# Patient Record
Sex: FEMALE | Race: WHITE | NOT HISPANIC OR LATINO | Employment: OTHER | URBAN - METROPOLITAN AREA
[De-identification: names, ages, dates, MRNs, and addresses within clinical notes are randomized per-mention and may not be internally consistent; named-entity substitution may affect disease eponyms.]

---

## 2017-02-27 ENCOUNTER — PREPPED CHART (OUTPATIENT)
Dept: URBAN - METROPOLITAN AREA CLINIC 36 | Facility: CLINIC | Age: 82
End: 2017-02-27

## 2017-10-23 ASSESSMENT — VISUAL ACUITY
OD_SC: 20/40
OS_SC: 20/50-2
OS_SC: J8
OD_SC: J3

## 2017-10-23 ASSESSMENT — TONOMETRY
OD_IOP_MMHG: 16
OS_IOP_MMHG: 16

## 2017-10-24 ENCOUNTER — FOLLOW UP (OUTPATIENT)
Dept: URBAN - METROPOLITAN AREA CLINIC 36 | Facility: CLINIC | Age: 82
End: 2017-10-24

## 2017-10-24 DIAGNOSIS — H11.10: ICD-10-CM

## 2017-10-24 DIAGNOSIS — H10.45: ICD-10-CM

## 2017-10-24 PROCEDURE — G8756 NO BP MEASURE DOC: HCPCS

## 2017-10-24 PROCEDURE — 92012 INTRM OPH EXAM EST PATIENT: CPT

## 2017-10-24 PROCEDURE — G8427 DOCREV CUR MEDS BY ELIG CLIN: HCPCS

## 2017-10-24 ASSESSMENT — VISUAL ACUITY
OS_SC: 20/70
OD_SC: 20/30

## 2017-10-24 ASSESSMENT — TONOMETRY
OS_IOP_MMHG: 16
OD_IOP_MMHG: 16

## 2018-05-02 ENCOUNTER — ESTABLISHED COMPREHENSIVE EXAM (OUTPATIENT)
Dept: URBAN - METROPOLITAN AREA CLINIC 36 | Facility: CLINIC | Age: 83
End: 2018-05-02

## 2018-05-02 DIAGNOSIS — H52.7: ICD-10-CM

## 2018-05-02 PROCEDURE — 92015 DETERMINE REFRACTIVE STATE: CPT

## 2018-05-02 PROCEDURE — 92014 COMPRE OPH EXAM EST PT 1/>: CPT

## 2018-05-02 ASSESSMENT — TONOMETRY
OS_IOP_MMHG: 12
OD_IOP_MMHG: 12

## 2018-05-02 ASSESSMENT — VISUAL ACUITY
OD_CC: 20/30+2
OS_SC: 20/50-2
OS_SC: J8
OS_CC: J2
OD_SC: J8
OS_CC: 20/30-2
OD_SC: 20/40
OD_CC: J1

## 2018-10-02 ENCOUNTER — ESTABLISHED PATIENT (OUTPATIENT)
Dept: URBAN - METROPOLITAN AREA CLINIC 36 | Facility: CLINIC | Age: 83
End: 2018-10-02

## 2018-10-02 DIAGNOSIS — H11.10: ICD-10-CM

## 2018-10-02 PROCEDURE — 92012 INTRM OPH EXAM EST PATIENT: CPT

## 2018-10-02 PROCEDURE — G8427 DOCREV CUR MEDS BY ELIG CLIN: HCPCS

## 2018-10-02 ASSESSMENT — VISUAL ACUITY
OD_PH: 20/40
OS_PH: 20/40
OS_SC: 20/80
OD_SC: 20/50

## 2018-10-02 ASSESSMENT — TONOMETRY
OD_IOP_MMHG: 16
OS_IOP_MMHG: 18

## 2019-05-06 ENCOUNTER — ESTABLISHED COMPREHENSIVE EXAM (OUTPATIENT)
Dept: URBAN - METROPOLITAN AREA CLINIC 36 | Facility: CLINIC | Age: 84
End: 2019-05-06

## 2019-05-06 DIAGNOSIS — H52.7: ICD-10-CM

## 2019-05-06 DIAGNOSIS — H26.491: ICD-10-CM

## 2019-05-06 DIAGNOSIS — H26.492: ICD-10-CM

## 2019-05-06 DIAGNOSIS — H35.3232: ICD-10-CM

## 2019-05-06 DIAGNOSIS — H11.10: ICD-10-CM

## 2019-05-06 DIAGNOSIS — H10.45: ICD-10-CM

## 2019-05-06 DIAGNOSIS — H35.073: ICD-10-CM

## 2019-05-06 PROCEDURE — 92014 COMPRE OPH EXAM EST PT 1/>: CPT

## 2019-05-06 PROCEDURE — 92134 CPTRZ OPH DX IMG PST SGM RTA: CPT

## 2019-05-06 PROCEDURE — 92015 DETERMINE REFRACTIVE STATE: CPT

## 2019-05-06 ASSESSMENT — VISUAL ACUITY
OS_SC: 20/70+2
OD_SC: 20/200
OS_SC: J8
OD_SC: J6
OD_CC: J2
OD_CC: 20/40
OS_CC: J3
OS_CC: 20/60-2

## 2019-05-06 ASSESSMENT — TONOMETRY
OS_IOP_MMHG: 16
OD_IOP_MMHG: 16

## 2019-11-04 ENCOUNTER — RETINA CONSULT (OUTPATIENT)
Dept: URBAN - METROPOLITAN AREA CLINIC 36 | Facility: CLINIC | Age: 84
End: 2019-11-04

## 2019-11-04 VITALS — DIASTOLIC BLOOD PRESSURE: 84 MMHG | HEART RATE: 72 BPM | HEIGHT: 55 IN | SYSTOLIC BLOOD PRESSURE: 158 MMHG

## 2019-11-04 DIAGNOSIS — H35.073: ICD-10-CM

## 2019-11-04 DIAGNOSIS — H35.3232: ICD-10-CM

## 2019-11-04 DIAGNOSIS — H35.363: ICD-10-CM

## 2019-11-04 DIAGNOSIS — H43.813: ICD-10-CM

## 2019-11-04 PROCEDURE — 9222550 BILAT EXTENDED OPHTHALMOSCOPY, FIRST

## 2019-11-04 PROCEDURE — 92004 COMPRE OPH EXAM NEW PT 1/>: CPT

## 2019-11-04 PROCEDURE — 92134 CPTRZ OPH DX IMG PST SGM RTA: CPT

## 2019-11-04 ASSESSMENT — TONOMETRY
OD_IOP_MMHG: 15
OS_IOP_MMHG: 15

## 2019-11-04 ASSESSMENT — VISUAL ACUITY
OD_CC: J3+1
OS_CC: J3+1
OS_CC: 20/50+1
OD_CC: 20/25
OS_PH: 20/40

## 2020-05-08 ENCOUNTER — ESTABLISHED COMPREHENSIVE EXAM (OUTPATIENT)
Dept: URBAN - METROPOLITAN AREA CLINIC 36 | Facility: CLINIC | Age: 85
End: 2020-05-08

## 2020-05-08 DIAGNOSIS — H26.492: ICD-10-CM

## 2020-05-08 DIAGNOSIS — H35.073: ICD-10-CM

## 2020-05-08 DIAGNOSIS — H52.7: ICD-10-CM

## 2020-05-08 DIAGNOSIS — H26.491: ICD-10-CM

## 2020-05-08 DIAGNOSIS — H35.3232: ICD-10-CM

## 2020-05-08 PROCEDURE — 92015 DETERMINE REFRACTIVE STATE: CPT

## 2020-05-08 PROCEDURE — 92134 CPTRZ OPH DX IMG PST SGM RTA: CPT

## 2020-05-08 PROCEDURE — 92014 COMPRE OPH EXAM EST PT 1/>: CPT

## 2020-05-08 RX ORDER — ERYTHROMYCIN 5 MG/G: OINTMENT OPHTHALMIC EVERY EVENING

## 2020-05-08 ASSESSMENT — VISUAL ACUITY
OD_CC: 20/40
OS_CC: 20/80-1
OD_SC: 20/40-1
OS_SC: J6
OD_CC: J2
OS_CC: J2
OS_SC: 20/400
OD_SC: J5

## 2021-05-14 ENCOUNTER — ESTABLISHED COMPREHENSIVE EXAM (OUTPATIENT)
Dept: URBAN - METROPOLITAN AREA CLINIC 36 | Facility: CLINIC | Age: 86
End: 2021-05-14

## 2021-05-14 DIAGNOSIS — H10.45: ICD-10-CM

## 2021-05-14 DIAGNOSIS — H26.492: ICD-10-CM

## 2021-05-14 DIAGNOSIS — H52.7: ICD-10-CM

## 2021-05-14 DIAGNOSIS — H35.073: ICD-10-CM

## 2021-05-14 DIAGNOSIS — H35.3232: ICD-10-CM

## 2021-05-14 DIAGNOSIS — H26.491: ICD-10-CM

## 2021-05-14 PROCEDURE — 92015 DETERMINE REFRACTIVE STATE: CPT

## 2021-05-14 PROCEDURE — 92014 COMPRE OPH EXAM EST PT 1/>: CPT

## 2021-05-14 PROCEDURE — 92134 CPTRZ OPH DX IMG PST SGM RTA: CPT

## 2021-05-14 ASSESSMENT — VISUAL ACUITY
OS_SC: 20/100
OS_CC: J3
OD_CC: J3
OD_CC: 20/40-1
OS_CC: 20/70
OD_SC: 20/70
OD_SC: J6
OS_SC: J3

## 2021-05-14 ASSESSMENT — TONOMETRY
OD_IOP_MMHG: 16
OS_IOP_MMHG: 14

## 2022-09-19 ENCOUNTER — HOSPITAL ENCOUNTER (OUTPATIENT)
Dept: ULTRASOUND IMAGING | Age: 87
Discharge: HOME | End: 2022-09-19
Payer: MEDICARE

## 2022-09-19 DIAGNOSIS — R10.84 COLICKY ABDOMINAL PAIN: ICD-10-CM

## 2022-09-19 PROCEDURE — 76705 ECHO EXAM OF ABDOMEN: CPT

## 2022-11-02 ENCOUNTER — HOSPITAL ENCOUNTER (OUTPATIENT)
Dept: CT IMAGING | Age: 87
Discharge: HOME | End: 2022-11-02
Payer: MEDICARE

## 2022-11-02 DIAGNOSIS — R10.9 ABDOMINAL PAIN, ACUTE: ICD-10-CM

## 2022-11-02 PROCEDURE — 6360000004 HC RX CONTRAST MEDICATION: Performed by: PSYCHIATRY & NEUROLOGY

## 2022-11-02 PROCEDURE — 74178 CT ABD&PLV WO CNTR FLWD CNTR: CPT

## 2022-11-02 RX ADMIN — DIATRIZOATE MEGLUMINE AND DIATRIZOATE SODIUM 20 ML: 600; 100 SOLUTION ORAL; RECTAL at 17:55

## 2022-11-02 RX ADMIN — IOPAMIDOL 75 ML: 755 INJECTION, SOLUTION INTRAVENOUS at 17:55

## 2023-02-08 DIAGNOSIS — R10.31 RIGHT LOWER QUADRANT PAIN: Primary | ICD-10-CM

## 2023-02-09 ENCOUNTER — HOSPITAL ENCOUNTER (OUTPATIENT)
Dept: CT IMAGING | Age: 88
Discharge: HOME | End: 2023-02-09
Payer: MEDICARE

## 2023-02-09 DIAGNOSIS — R10.31 RIGHT LOWER QUADRANT PAIN: ICD-10-CM

## 2023-02-09 PROCEDURE — 74176 CT ABD & PELVIS W/O CONTRAST: CPT

## 2023-02-16 ENCOUNTER — HOSPITAL ENCOUNTER (OUTPATIENT)
Dept: WOUND CARE | Age: 88
Discharge: HOME | End: 2023-02-16
Payer: MEDICARE

## 2023-02-16 VITALS
WEIGHT: 136.8 LBS | HEART RATE: 73 BPM | RESPIRATION RATE: 16 BRPM | SYSTOLIC BLOOD PRESSURE: 177 MMHG | DIASTOLIC BLOOD PRESSURE: 86 MMHG

## 2023-02-16 PROCEDURE — 99212 OFFICE O/P EST SF 10 MIN: CPT

## 2023-02-16 RX ORDER — MOMETASONE FUROATE 1 MG/ML
SOLUTION TOPICAL
COMMUNITY
Start: 2022-07-28 | End: 2023-02-16

## 2023-02-16 RX ORDER — TRIAMTERENE AND HYDROCHLOROTHIAZIDE 75; 50 MG/1; MG/1
TABLET ORAL
COMMUNITY
Start: 2022-12-31

## 2023-02-16 RX ORDER — AZELASTINE 1 MG/ML
SPRAY, METERED NASAL
COMMUNITY
Start: 2023-02-06

## 2023-02-16 RX ORDER — DICYCLOMINE HCL 20 MG
1 TABLET ORAL
COMMUNITY

## 2023-02-16 RX ORDER — DIPHENHYDRAMINE HCL 25 MG
TABLET ORAL
COMMUNITY
Start: 2022-10-14

## 2023-02-16 RX ORDER — MAGNESIUM OXIDE 400 MG/1
TABLET ORAL
COMMUNITY

## 2023-02-16 RX ORDER — AMITRIPTYLINE HYDROCHLORIDE 50 MG/1
TABLET, FILM COATED ORAL
COMMUNITY
Start: 2022-05-26

## 2023-02-16 RX ORDER — CHOLESTYRAMINE 4 G/9G
POWDER, FOR SUSPENSION ORAL
COMMUNITY

## 2023-02-16 RX ORDER — ATENOLOL 50 MG/1
TABLET ORAL
COMMUNITY
Start: 2022-05-31

## 2023-02-16 RX ORDER — ALPRAZOLAM 0.5 MG/1
TABLET ORAL
COMMUNITY
Start: 2023-02-13

## 2023-02-16 NOTE — PLAN OF CARE
Discharge instructions given. Patient verbalized understanding. Return to [facility] in 1 week(s).   Continue Ostomy Care

## 2023-02-16 NOTE — DISCHARGE INSTRUCTIONS
[facility]  [address]   [place], [place] [address]  Telephone: [phone]     FAX [phone]        Name: Jojo Alejandre  : 1935   AGE: 87 y.o.  MRN: [id number]  Today's Date: 2023    Ostomy skin care  Cleanse skin prior to applying a new pouch/wafer with:  yes - Water    yes - Cleanse skin with Mild Soap & Water  yes - May Shower    Other: Size- 1&3/8      Topical Treatments to skin around stoma:  Do not apply lotions, creams, or ointments to wound bed unless directed.   no - Apply barrier film/spray to skin around stoma and let it dry  no - Apply stoma powder to irritated skin around stoma, seal in with barrier film/spray and repeat x2  no - Apply antifungal cream to irritated skin surrounding stoma and work into skin until no longer able to feel cream.  no - Apply antifungal powder to irritated skin surrounding stoma, seal in with barrier film; and repeat x1  Other:     Pouch/Wafer Application     - Change your pouch every 3 days or when leaking.  yes - Appliance: 1 piece Coloplast    yes - Product Numbers:[id number], Ring [id number]  yes - Other: Accessories: rings, barrier, powder, and film  Other:____________________________________    Application of Pouch  yes - Gather supplies needed to change pouch and wafer.  yes - Assemble new pouch system before removing old one.  yes - Using the measuring guide or pattern, trace size of opening onto back new pouch system.  yes - Cut out opening.  yes - Remove the control backing  yes - Set aside assembled pouch  yes - Remove worn appliance by gently pulling away from skin.  yes - Look at back of the pouch before throwing away to see how it is worn.   yes - Wash skin with warm water or _____________, rinse and pat dry.    yes - Inspect  skin for redness or irritation.   yes - Apply  barrier seals or rings around stoma or back of wafer.  yes - Apply wafer/pouch system over stoma and press down firmly starting around stoma and working  outward. yes - Remove control backing paper tape border if applicable and press to skin. yes - Attach new pouch to wafer. yes - Secure bottom of pouch. yes - Apply barrier extenders to outside edges of pouch. yes - Lay/Sit quietly for 15-20 min to allow adhesives to mold to skin. Other: _____________________________________    Emptying Pouch; Colostomy or Urostomy  Colostomy:  yes - Empty  pouch when 1/3 full of gas, stool. Clean bottom edge with damp toilet paper or bathroom wipe and close pouch. no - For non-drainable pouch - remove when 1/2 full and throw away. Clean wafer flange (ring) with toilet tissue or damp paper towel before reapplying new pouch  Urostomy:  yes - Empty  pouch when 1/3 full of urine    N/A - Urostomy:  Attach bottom connector of urostomy pouch to a nighttime drainage system and switch spigot to the open position. N/A - Other: Urostomy: Care of nighttime drainage  ________________________________________________________________________    Other:  yes - Shower or Swim Care Pat pouch dry, Use hair dryer on cool setting to dry back of pouch and border, and Reapply barrier extenders or tape. N/A - Odor Control with deodorizer into bottom of pouch after each emptying  N/A - Lubricating gel to pouch to help emptying of thick stool  Other: See additional instructions _______________________________________     Contact Ostomy Care Nurse Practitioner  leaking issues and skin irritation    [email or web address]   -to order under garments     Follow up in the wound care center in 1 week(s)

## 2023-02-17 PROBLEM — Z43.3 COLOSTOMY CARE (HCC): Status: ACTIVE | Noted: 2023-02-17

## 2023-02-17 NOTE — PROGRESS NOTES
[address] [facility] [facility] [address], [address] [place] [address]  Telephone: [phone]      NAME:  Rojas RECORD NUMBER:  [id number]  AGE: 82 y.o. GENDER:  female  :  1935  TODAY'S DATE:  2023    Subjective       Chief Complaint   Patient presents with    Wound Check     Area around stoma with blisters and bleeding         HISTORY of PRESENT ILLNESS HPI     Bladimir Joiner is a 82 y.o. female New patient referred by self, who presents today for ostomy/stoma evaluation. Pt is also under care of Dr. Hannah Singer for cardiac issues and AAA. Pouching/Skin Issues: blisters and bleeding. Current Pouching System: Coloplast Sensura [place] convex drainable, pre-cut pouch. Precut to  \" opening. History of Ostomy: /Surgeon: in [place]  Wound/Ulcer Pain Timing/Severity: intermittent, mild  Quality of pain: tender  Severity:   10   Modifying Factors: None  Associated Signs/Symptoms: none    PAST MEDICAL HISTORY        Diagnosis Date    Arthritis     Colostomy care [facility])     Diverticulitis     Hypertension        PAST SURGICAL HISTORY    Past Surgical History:   Procedure Laterality Date    ABDOMEN SURGERY      APPENDECTOMY      HYSTERECTOMY (CERVIX STATUS UNKNOWN)         FAMILY HISTORY    History reviewed. No pertinent family history. SOCIAL HISTORY    Social History     Tobacco Use    Smoking status: Every Day     Packs/day: 0.50     Years: 7.00     Pack years: 3.50     Types: Cigarettes     The patient was instructed/counseled on smoking cessation. ALLERGIES    Allergies   Allergen Reactions    Amoxicillin-Pot Clavulanate     Diatrizoate Sodium And Diatrizoate Meglumine [Diatrizoate]      Patient allergic to ivp dye (itching)    Erythromycin     Moxifloxacin     Propoxyphene     Amoxicillin Itching    Doxycycline Itching     Other reaction(s):  Other (See Comments)  Blurred vision    Ipratropium Other (See Comments)    Ciprofibrate Nausea Only Fluticasone Other (See Comments)    Iodinated Contrast Media     Sulfa Antibiotics        MEDICATIONS    Current Outpatient Medications on File Prior to Encounter   Medication Sig Dispense Refill    amitriptyline (ELAVIL) 50 MG tablet amitriptyline 50 mg tablet   Take 1 tablet every day by oral route. atenolol (TENORMIN) 50 MG tablet atenolol 50 mg tablet   TAKE 1 TABLET TWICE DAILY      azelastine (ASTELIN) 0.1 % nasal spray SPRAY 2 SPRAYS INTO NOSE TWICE DAILY; USE IN EACH NOSTRIL AS DIRECTED      diphenhydrAMINE (WAL-DRYL ALLERGY) 25 MG tablet TAKE 2 TABLETS 1 HOUR BEFORE CT      ALPRAZolam (XANAX) 0.5 MG tablet       cholestyramine (QUESTRAN) 4 g packet cholestyramine (with sugar) 4 gram powder for susp in a packet   DISSOLVE 1 PACKET AND TK PO BID  FOR 30 DAYS UTD      dicyclomine (BENTYL) 20 MG tablet Take 1 tablet by mouth 4 times daily (after meals and at bedtime)      magnesium oxide (MAG-OX) 400 MG tablet magnesium oxide 400 mg (241.3 mg magnesium) tablet   TK 1 T PO  BID      triamterene-hydroCHLOROthiazide (MAXZIDE) 75-50 MG per tablet        No current facility-administered medications on file prior to encounter. REVIEW OF SYSTEMS    Pertinent items are noted in HPI. Objective    BP (!) 177/86   Pulse 73   Resp 16   Wt 136 lb 12.8 oz (62.1 kg)     Wt Readings from Last 3 Encounters:   02/16/23 136 lb 12.8 oz (62.1 kg)       PHYSICAL EXAM    General Appearance: alert and oriented to person, place and time and in no acute distress  Skin: warm and dry  Head: normocephalic and atraumatic  Eyes: pupils equal, round, and reactive to light  Pulmonary/Chest:  normal air movement, no respiratory distress  Cardiovascular: normal rate and regular rhythm    Ostomy Type: colostomy with formed stool    Stoma Assessment: red, moist measures 1 3/8\"    Peristomal Skin Assessment: some blister and skin breakdown noted, silver nitrate applied to areas to stop bleeding.  Small reducible peristomal hernia noted.       LABS       CBC:   Lab Results   Component Value Date/Time    WBC 7.4 2021 11:12 AM    HGB 14.3 2021 11:12 AM    HCT 41.3 2021 11:12 AM    MCV 96.4 2021 11:12 AM     2021 11:12 AM     BMP:   Lab Results   Component Value Date/Time     10/04/2022 03:02 PM    K 4.1 10/04/2022 03:02 PM    CL 92 10/04/2022 03:02 PM    CO2 29 10/04/2022 03:02 PM    BUN 24 10/04/2022 03:02 PM    CREATININE 1.1 10/04/2022 03:02 PM     PT/INR: No results found for: PROTIME, INR  Prealbumin: No results found for: PREALBUMIN  Albumin:  Lab Results   Component Value Date/Time    LABALBU 4.5 10/04/2022 03:02 PM     Sed Rate:No results found for: SEDRATE  Micro: No results found for: [facility]   Plan   Patient examined and evaluated and discussed how hernia has made stoma larger. Recommended use of a barrier ring and then when need to order new pouches to increase pre-cut opening to 1 3/8\" to accommodate size of stoma. She is agreeable and will follow up in one week. Time spent in visit: 25 minutes  Please see attached Discharge Instructions    Written patient dismissal instructions given to patient and signed by patient or POA. Discharge Instructions         [address], [address] [place] [address]  Telephone: [phone]        Name: Ivette Navarro  : 1935   AGE: 82 y.o. MRN: [id number]  Today's Date: 2023    Ostomy skin care  Cleanse skin prior to applying a new pouch/wafer with:  yes - Water    yes - Cleanse skin with Mild Soap & Water  yes - May Shower    Other: Size- 1&3/8      Topical Treatments to skin around stoma:  Do not apply lotions, creams, or ointments to wound bed unless directed.    no - Apply barrier film/spray to skin around stoma and let it dry  no - Apply stoma powder to irritated skin around stoma, seal in with barrier film/spray and repeat x2  no - Apply antifungal cream to irritated skin surrounding stoma and work into skin until no longer able to feel cream.  no - Apply antifungal powder to irritated skin surrounding stoma, seal in with barrier film; and repeat x1  Other:     Pouch/Wafer Application     - Change your pouch every 3 days or when leaking. yes - Appliance: 1 piece Coloplast    yes - Product Numbers:[id number], Ring [id number]  yes - Other: Accessories: rings, barrier, powder, and film  Other:____________________________________    Application of Pouch  yes - Gather supplies needed to change pouch and wafer. yes - Assemble new pouch system before removing old one.  yes - Using the measuring guide or pattern, trace size of opening onto back new pouch system. yes - Cut out opening. yes - Remove the control backing  yes - Set aside assembled pouch  yes - Remove worn appliance by gently pulling away from skin. yes - Look at back of the pouch before throwing away to see how it is worn.   yes - Wash skin with warm water or _____________, rinse and pat dry. yes - Inspect  skin for redness or irritation. yes - Apply  barrier seals or rings around stoma or back of wafer. yes - Apply wafer/pouch system over stoma and press down firmly starting around stoma and working outward. yes - Remove control backing paper tape border if applicable and press to skin. yes - Attach new pouch to wafer. yes - Secure bottom of pouch. yes - Apply barrier extenders to outside edges of pouch. yes - Lay/Sit quietly for 15-20 min to allow adhesives to mold to skin. Other: _____________________________________    Emptying Pouch; Colostomy or Urostomy  Colostomy:  yes - Empty  pouch when 1/3 full of gas, stool. Clean bottom edge with damp toilet paper or bathroom wipe and close pouch. no - For non-drainable pouch - remove when 1/2 full and throw away.   Clean wafer flange (ring) with toilet tissue or damp paper towel before reapplying new pouch  Urostomy:  yes - Empty  pouch when 1/3 full of urine    N/A - Urostomy:  Attach bottom connector of urostomy pouch to a nighttime drainage system and switch spigot to the open position. N/A - Other: Urostomy: Care of nighttime drainage  ________________________________________________________________________    Other:  yes - Shower or Swim Care Pat pouch dry, Use hair dryer on cool setting to dry back of pouch and border, and Reapply barrier extenders or tape. N/A - Odor Control with deodorizer into bottom of pouch after each emptying  N/A - Lubricating gel to pouch to help emptying of thick stool  Other: See additional instructions _______________________________________     Contact Ostomy Care Nurse Practitioner  leaking issues and skin irritation    [email or web address]   -to order under garments     Follow up in the wound care center in 1 week(s)            Electronically signed by AYAN Bell CNP on 2/17/2023 at 1:21 PM

## 2023-02-20 NOTE — DISCHARGE INSTRUCTIONS
[facility]  [address]  Telephone: [phone]           Name: Sammie Ortiz  : 1935   AGE: 82 y.o. MRN: [id number]  Today's Date: 2023     Ostomy skin care  Cleanse skin prior to applying a new pouch/wafer with:  yes - Water         yes - Cleanse skin with Mild Soap & Water  yes - May Shower    Other: Size- 1&3/8       Topical Treatments to skin around stoma:  Do not apply lotions, creams, or ointments to wound bed unless directed. no - Apply barrier film/spray to skin around stoma and let it dry  no - Apply stoma powder to irritated skin around stoma, seal in with barrier film/spray and repeat x2  no - Apply antifungal cream to irritated skin surrounding stoma and work into skin until no longer able to feel cream.  no - Apply antifungal powder to irritated skin surrounding stoma, seal in with barrier film; and repeat x1  Other:      Pouch/Wafer Application     - Change your pouch every 3 days or when leaking. yes - Appliance: 1 piece Coloplast    yes - Product Numbers:[id number], Ring [id number]  yes - Other: Accessories: rings, barrier, powder, and film  Other:____________________________________     Application of Pouch  yes - Gather supplies needed to change pouch and wafer. yes - Assemble new pouch system before removing old one.  yes - Using the measuring guide or pattern, trace size of opening onto back new pouch system. yes - Cut out opening. yes - Remove the control backing  yes - Set aside assembled pouch  yes - Remove worn appliance by gently pulling away from skin. yes - Look at back of the pouch before throwing away to see how it is worn.   yes - Wash skin with warm water or _____________, rinse and pat dry. yes - Inspect  skin for redness or irritation. yes - Apply  barrier seals or rings around stoma or back of wafer.   yes - Apply wafer/pouch system over stoma and press down firmly starting around stoma and working outward. yes - Remove control backing paper tape border if applicable and press to skin. yes - Attach new pouch to wafer. yes - Secure bottom of pouch. yes - Apply barrier extenders to outside edges of pouch. yes - Lay/Sit quietly for 15-20 min to allow adhesives to mold to skin. Other: _____________________________________     Emptying Pouch; Colostomy or Urostomy  Colostomy:  yes - Empty  pouch when 1/3 full of gas, stool. Clean bottom edge with damp toilet paper or bathroom wipe and close pouch. no - For non-drainable pouch - remove when 1/2 full and throw away. Clean wafer flange (ring) with toilet tissue or damp paper towel before reapplying new pouch  Urostomy:  yes - Empty  pouch when 1/3 full of urine     N/A - Urostomy:  Attach bottom connector of urostomy pouch to a nighttime drainage system and switch spigot to the open position. N/A - Other: Urostomy: Care of nighttime drainage  ________________________________________________________________________     Other:  yes - Shower or Swim Care Pat pouch dry, Use hair dryer on cool setting to dry back of pouch and border, and Reapply barrier extenders or tape. N/A - Odor Control with deodorizer into bottom of pouch after each emptying  N/A - Lubricating gel to pouch to help emptying of thick stool  Other: See additional instructions _______________________________________      Contact Ostomy Care Nurse Practitioner  leaking issues and skin irritation     [email or web address]   -to order under garments     No Follow up in the wound care center

## 2023-02-23 ENCOUNTER — HOSPITAL ENCOUNTER (OUTPATIENT)
Dept: WOUND CARE | Age: 88
Discharge: HOME | End: 2023-02-23
Payer: MEDICARE

## 2023-02-23 VITALS
DIASTOLIC BLOOD PRESSURE: 80 MMHG | HEART RATE: 72 BPM | SYSTOLIC BLOOD PRESSURE: 154 MMHG | RESPIRATION RATE: 16 BRPM | WEIGHT: 137.2 LBS

## 2023-02-23 PROCEDURE — 99212 OFFICE O/P EST SF 10 MIN: CPT

## 2023-02-23 NOTE — PLAN OF CARE
Discharge instructions given. Patient verbalized understanding. Return to [facility] in 1 week(s).   Continue Ostomy Care

## 2023-02-24 NOTE — PROGRESS NOTES
[address] [facility]   [address], [address] [place] [address]  Telephone: [phone]      NAME:  Rojas RECORD NUMBER:  [id number]  AGE: 82 y.o. GENDER:  female  :  1935  TODAY'S DATE:  2023    Subjective       Chief Complaint   Patient presents with    Wound Check     Follow up ostomy care         HISTORY of PRESENT ILLNESS TOR Marks is a 82 y.o. female patient referred by self, who presents today for ostomy/stoma evaluation. Pt is also under care of Dr. Aj Kent for cardiac issues and AAA. Pt presents today without blisters or more bleeding. She did like the barrier ring but is still getting 3-4 day wear time without barrier ring. She will follow-up as needed. Pouching/Skin Issues: blisters and bleeding. Current Pouching System: Coloplast Sensura [place] convex drainable, pre-cut pouch. Precut to  \" opening. History of Ostomy: 2014/Surgeon: in [place]  Wound/Ulcer Pain Timing/Severity: intermittent, mild  Quality of pain: tender  Severity:  1 / 10   Modifying Factors: None  Associated Signs/Symptoms: none  PAST MEDICAL HISTORY        Diagnosis Date    Arthritis     Colostomy care [facility])     Diverticulitis     Hypertension        PAST SURGICAL HISTORY    Past Surgical History:   Procedure Laterality Date    ABDOMEN SURGERY      APPENDECTOMY      HYSTERECTOMY (CERVIX STATUS UNKNOWN)         FAMILY HISTORY    No family history on file. SOCIAL HISTORY    Social History     Tobacco Use    Smoking status: Every Day     Packs/day: 0.50     Years: 7.00     Pack years: 3.50     Types: Cigarettes     The patient was instructed/counseled on smoking cessation.    ALLERGIES    Allergies   Allergen Reactions    Amoxicillin-Pot Clavulanate     Diatrizoate Sodium And Diatrizoate Meglumine [Diatrizoate]      Patient allergic to ivp dye (itching)    Erythromycin     Moxifloxacin     Propoxyphene     Amoxicillin Itching    Doxycycline Itching Other reaction(s): Other (See Comments)  Blurred vision    Ipratropium Other (See Comments)    Ciprofibrate Nausea Only    Fluticasone Other (See Comments)    Iodinated Contrast Media     Sulfa Antibiotics        MEDICATIONS    Current Outpatient Medications on File Prior to Encounter   Medication Sig Dispense Refill    ALPRAZolam (XANAX) 0.5 MG tablet       amitriptyline (ELAVIL) 50 MG tablet amitriptyline 50 mg tablet   Take 1 tablet every day by oral route. atenolol (TENORMIN) 50 MG tablet atenolol 50 mg tablet   TAKE 1 TABLET TWICE DAILY      azelastine (ASTELIN) 0.1 % nasal spray SPRAY 2 SPRAYS INTO NOSE TWICE DAILY; USE IN EACH NOSTRIL AS DIRECTED      cholestyramine (QUESTRAN) 4 g packet cholestyramine (with sugar) 4 gram powder for susp in a packet   DISSOLVE 1 PACKET AND TK PO BID  FOR 30 DAYS UTD      dicyclomine (BENTYL) 20 MG tablet Take 1 tablet by mouth 4 times daily (after meals and at bedtime)      diphenhydrAMINE (WAL-DRYL ALLERGY) 25 MG tablet TAKE 2 TABLETS 1 HOUR BEFORE CT      magnesium oxide (MAG-OX) 400 MG tablet magnesium oxide 400 mg (241.3 mg magnesium) tablet   TK 1 T PO  BID      triamterene-hydroCHLOROthiazide (MAXZIDE) 75-50 MG per tablet        No current facility-administered medications on file prior to encounter. REVIEW OF SYSTEMS    Pertinent items are noted in HPI. Objective    BP (!) 154/80   Pulse 72   Resp 16   Wt 137 lb 3.2 oz (62.2 kg)     Wt Readings from Last 3 Encounters:   02/23/23 137 lb 3.2 oz (62.2 kg)   02/16/23 136 lb 12.8 oz (62.1 kg)       PHYSICAL EXAM    General Appearance: alert and oriented to person, place and time  Skin: warm and dry  Head: normocephalic and atraumatic  Eyes: pupils equal, round, and reactive to light  Pulmonary/Chest normal air movement, no respiratory distress  Cardiovascular: normal rate and regular rhythm    Ostomy Type: colostomy with flat, red, moist stoma.         Plan   Patient examined and evaluated and pt agreeable to follow-up as needed. Time spent in visit 15 minutes  Please see attached Discharge Instructions    Written patient dismissal instructions given to patient and signed by patient or POA. Discharge Instructions         [address], [address] [place] [address]  Telephone: [phone]           Name: Indra Grimaldo  : 1935   AGE: 82 y.o. MRN: [id number]  Today's Date: 2023     Ostomy skin care  Cleanse skin prior to applying a new pouch/wafer with:  yes - Water         yes - Cleanse skin with Mild Soap & Water  yes - May Shower    Other: Size- 1&3/8       Topical Treatments to skin around stoma:  Do not apply lotions, creams, or ointments to wound bed unless directed. no - Apply barrier film/spray to skin around stoma and let it dry  no - Apply stoma powder to irritated skin around stoma, seal in with barrier film/spray and repeat x2  no - Apply antifungal cream to irritated skin surrounding stoma and work into skin until no longer able to feel cream.  no - Apply antifungal powder to irritated skin surrounding stoma, seal in with barrier film; and repeat x1  Other:      Pouch/Wafer Application     - Change your pouch every 3 days or when leaking. yes - Appliance: 1 piece Coloplast    yes - Product Numbers:[id number], Ring [id number]  yes - Other: Accessories: rings, barrier, powder, and film  Other:____________________________________     Application of Pouch  yes - Gather supplies needed to change pouch and wafer. yes - Assemble new pouch system before removing old one.  yes - Using the measuring guide or pattern, trace size of opening onto back new pouch system. yes - Cut out opening. yes - Remove the control backing  yes - Set aside assembled pouch  yes - Remove worn appliance by gently pulling away from skin.   yes - Look at back of the pouch before throwing away to see how it is worn.   yes - Wash skin with warm water or _____________, rinse and pat dry. yes - Inspect  skin for redness or irritation. yes - Apply  barrier seals or rings around stoma or back of wafer. yes - Apply wafer/pouch system over stoma and press down firmly starting around stoma and working outward. yes - Remove control backing paper tape border if applicable and press to skin. yes - Attach new pouch to wafer. yes - Secure bottom of pouch. yes - Apply barrier extenders to outside edges of pouch. yes - Lay/Sit quietly for 15-20 min to allow adhesives to mold to skin. Other: _____________________________________     Emptying Pouch; Colostomy or Urostomy  Colostomy:  yes - Empty  pouch when 1/3 full of gas, stool. Clean bottom edge with damp toilet paper or bathroom wipe and close pouch. no - For non-drainable pouch - remove when 1/2 full and throw away. Clean wafer flange (ring) with toilet tissue or damp paper towel before reapplying new pouch  Urostomy:  yes - Empty  pouch when 1/3 full of urine     N/A - Urostomy:  Attach bottom connector of urostomy pouch to a nighttime drainage system and switch spigot to the open position. N/A - Other: Urostomy: Care of nighttime drainage  ________________________________________________________________________     Other:  yes - Shower or Swim Care Pat pouch dry, Use hair dryer on cool setting to dry back of pouch and border, and Reapply barrier extenders or tape. N/A - Odor Control with deodorizer into bottom of pouch after each emptying  N/A - Lubricating gel to pouch to help emptying of thick stool  Other: See additional instructions _______________________________________      Contact Ostomy Care Nurse Practitioner  leaking issues and skin irritation     [email or web address]   -to order under garments     No Follow up in the wound care center           Electronically signed by AYAN Yee CNP on 2/24/2023 at 1:30 PM

## 2023-08-23 ENCOUNTER — HOSPITAL ENCOUNTER (OUTPATIENT)
Dept: CT IMAGING | Age: 88
Discharge: HOME | End: 2023-08-23
Attending: INTERNAL MEDICINE
Payer: MEDICARE

## 2023-08-23 DIAGNOSIS — I77.1 STRICTURE OF ARTERY (HCC): ICD-10-CM

## 2023-08-23 LAB
BUN SERPL-MCNC: 28 MG/DL (ref 7–20)
CREAT SERPL-MCNC: 1 MG/DL (ref 0.6–1.2)
GFR SERPLBLD CREATININE-BSD FMLA CKD-EPI: 55 ML/MIN/{1.73_M2}

## 2023-08-23 PROCEDURE — 84520 ASSAY OF UREA NITROGEN: CPT

## 2023-08-23 PROCEDURE — 36415 COLL VENOUS BLD VENIPUNCTURE: CPT

## 2023-08-23 PROCEDURE — 82565 ASSAY OF CREATININE: CPT

## 2023-08-23 PROCEDURE — 74177 CT ABD & PELVIS W/CONTRAST: CPT

## 2023-08-23 PROCEDURE — 6360000004 HC RX CONTRAST MEDICATION: Performed by: INTERNAL MEDICINE

## 2023-08-23 RX ADMIN — IOPAMIDOL 75 ML: 755 INJECTION, SOLUTION INTRAVENOUS at 12:07

## 2023-10-19 ENCOUNTER — HOSPITAL ENCOUNTER (OUTPATIENT)
Dept: ULTRASOUND IMAGING | Age: 88
Discharge: HOME | End: 2023-10-19
Attending: INTERNAL MEDICINE
Payer: MEDICARE

## 2023-10-19 DIAGNOSIS — R11.0 NAUSEA: ICD-10-CM

## 2023-10-19 PROCEDURE — 76700 US EXAM ABDOM COMPLETE: CPT

## 2023-10-31 ENCOUNTER — OFFICE VISIT (OUTPATIENT)
Dept: VASCULAR SURGERY | Age: 88
End: 2023-10-31
Payer: MEDICARE

## 2023-10-31 VITALS
BODY MASS INDEX: 24.1 KG/M2 | DIASTOLIC BLOOD PRESSURE: 86 MMHG | SYSTOLIC BLOOD PRESSURE: 124 MMHG | HEIGHT: 63 IN | WEIGHT: 136 LBS

## 2023-10-31 DIAGNOSIS — I71.43 INFRARENAL ABDOMINAL AORTIC ANEURYSM (AAA) WITHOUT RUPTURE (HCC): Primary | ICD-10-CM

## 2023-10-31 PROBLEM — N60.19 FIBROCYSTIC DISEASE OF BREAST: Status: ACTIVE | Noted: 2017-01-27

## 2023-10-31 PROBLEM — H35.3110 NONEXUDATIVE AGE-RELATED MACULAR DEGENERATION OF RIGHT EYE: Status: ACTIVE | Noted: 2022-01-25

## 2023-10-31 PROBLEM — F41.1 GENERALIZED ANXIETY DISORDER: Status: ACTIVE | Noted: 2017-01-27

## 2023-10-31 PROBLEM — F17.200 TOBACCO DEPENDENCE SYNDROME: Status: ACTIVE | Noted: 2023-10-31

## 2023-10-31 PROBLEM — E61.2 MAGNESIUM DEFICIENCY: Status: ACTIVE | Noted: 2017-01-27

## 2023-10-31 PROBLEM — M21.70 LEG LENGTH INEQUALITY: Status: ACTIVE | Noted: 2020-12-01

## 2023-10-31 PROBLEM — K52.9 GASTROENTERITIS: Status: ACTIVE | Noted: 2023-10-31

## 2023-10-31 PROBLEM — L57.0 ACTINIC KERATOSIS: Status: ACTIVE | Noted: 2017-03-16

## 2023-10-31 PROBLEM — I10 BENIGN ESSENTIAL HYPERTENSION: Status: ACTIVE | Noted: 2023-10-31

## 2023-10-31 PROBLEM — E66.3 OVERWEIGHT WITH BODY MASS INDEX (BMI) 25.0-29.9: Status: ACTIVE | Noted: 2023-10-31

## 2023-10-31 PROBLEM — H65.23 BILATERAL CHRONIC SEROUS OTITIS MEDIA: Status: ACTIVE | Noted: 2017-10-09

## 2023-10-31 PROBLEM — H65.91 RIGHT SEROUS OTITIS MEDIA: Status: ACTIVE | Noted: 2018-12-28

## 2023-10-31 PROBLEM — R19.00 SWOLLEN ABDOMEN: Status: ACTIVE | Noted: 2017-08-11

## 2023-10-31 PROBLEM — Z90.49 HISTORY OF COLECTOMY: Status: ACTIVE | Noted: 2018-07-18

## 2023-10-31 PROBLEM — M54.9 CHRONIC BACK PAIN: Status: ACTIVE | Noted: 2018-09-13

## 2023-10-31 PROBLEM — T78.40XA ACUTE ALLERGIC REACTION: Status: ACTIVE | Noted: 2023-10-31

## 2023-10-31 PROBLEM — R21 SKIN ERUPTION: Status: ACTIVE | Noted: 2023-10-31

## 2023-10-31 PROBLEM — R10.9 ABDOMINAL PAIN: Status: ACTIVE | Noted: 2023-10-31

## 2023-10-31 PROBLEM — L98.9 SKIN LESION: Status: ACTIVE | Noted: 2017-01-27

## 2023-10-31 PROBLEM — M25.519 SHOULDER PAIN: Status: ACTIVE | Noted: 2019-10-10

## 2023-10-31 PROBLEM — M51.36 DDD (DEGENERATIVE DISC DISEASE), LUMBAR: Status: ACTIVE | Noted: 2021-10-19

## 2023-10-31 PROBLEM — M51.369 DDD (DEGENERATIVE DISC DISEASE), LUMBAR: Status: ACTIVE | Noted: 2021-10-19

## 2023-10-31 PROBLEM — M54.32 BILATERAL SCIATICA: Status: ACTIVE | Noted: 2020-12-01

## 2023-10-31 PROBLEM — K58.9 IRRITABLE BOWEL SYNDROME: Status: ACTIVE | Noted: 2023-10-31

## 2023-10-31 PROBLEM — E87.8 ELECTROLYTE IMBALANCE: Status: ACTIVE | Noted: 2017-01-27

## 2023-10-31 PROBLEM — F41.1 ANXIETY STATE: Status: ACTIVE | Noted: 2023-10-31

## 2023-10-31 PROBLEM — K52.9 NONINFECTIOUS GASTROENTERITIS: Status: ACTIVE | Noted: 2023-10-31

## 2023-10-31 PROBLEM — M75.50 BURSITIS OF SHOULDER: Status: ACTIVE | Noted: 2023-10-31

## 2023-10-31 PROBLEM — M79.604 PAIN OF RIGHT LOWER EXTREMITY: Status: ACTIVE | Noted: 2020-12-01

## 2023-10-31 PROBLEM — R42 DIZZINESS: Status: ACTIVE | Noted: 2017-10-09

## 2023-10-31 PROBLEM — I71.40 ABDOMINAL AORTIC ANEURYSM (AAA) WITHOUT RUPTURE (HCC): Status: ACTIVE | Noted: 2021-06-16

## 2023-10-31 PROBLEM — E87.1 HYPONATREMIA: Status: ACTIVE | Noted: 2019-04-11

## 2023-10-31 PROBLEM — J42 CHRONIC BRONCHITIS (HCC): Status: ACTIVE | Noted: 2017-01-27

## 2023-10-31 PROBLEM — H92.03 OTALGIA OF BOTH EARS: Status: ACTIVE | Noted: 2017-10-09

## 2023-10-31 PROBLEM — N39.0 URINARY TRACT INFECTIOUS DISEASE: Status: ACTIVE | Noted: 2020-08-23

## 2023-10-31 PROBLEM — H10.30 ACUTE CONJUNCTIVITIS: Status: ACTIVE | Noted: 2019-12-30

## 2023-10-31 PROBLEM — H66.90 OTITIS MEDIA: Status: ACTIVE | Noted: 2019-07-11

## 2023-10-31 PROBLEM — G89.29 CHRONIC BACK PAIN: Status: ACTIVE | Noted: 2018-09-13

## 2023-10-31 PROBLEM — E87.6 HYPOKALEMIA: Status: ACTIVE | Noted: 2023-10-31

## 2023-10-31 PROBLEM — M54.31 BILATERAL SCIATICA: Status: ACTIVE | Noted: 2020-12-01

## 2023-10-31 PROBLEM — M26.609 TEMPOROMANDIBULAR JOINT DISORDER: Status: ACTIVE | Noted: 2020-05-21

## 2023-10-31 PROBLEM — J30.9 ALLERGIC RHINITIS: Status: ACTIVE | Noted: 2023-10-31

## 2023-10-31 PROBLEM — M41.25 OTHER IDIOPATHIC SCOLIOSIS, THORACOLUMBAR REGION: Status: ACTIVE | Noted: 2021-10-19

## 2023-10-31 PROCEDURE — 4004F PT TOBACCO SCREEN RCVD TLK: CPT | Performed by: SURGERY

## 2023-10-31 PROCEDURE — G8484 FLU IMMUNIZE NO ADMIN: HCPCS | Performed by: SURGERY

## 2023-10-31 PROCEDURE — 99203 OFFICE O/P NEW LOW 30 MIN: CPT | Performed by: SURGERY

## 2023-10-31 PROCEDURE — 1123F ACP DISCUSS/DSCN MKR DOCD: CPT | Performed by: SURGERY

## 2023-10-31 PROCEDURE — G8428 CUR MEDS NOT DOCUMENT: HCPCS | Performed by: SURGERY

## 2023-10-31 PROCEDURE — G8420 CALC BMI NORM PARAMETERS: HCPCS | Performed by: SURGERY

## 2023-10-31 PROCEDURE — 1090F PRES/ABSN URINE INCON ASSESS: CPT | Performed by: SURGERY

## 2023-10-31 RX ORDER — NEOMYCIN SULFATE 500 MG/1
TABLET ORAL
COMMUNITY

## 2023-10-31 RX ORDER — DIFLUPREDNATE OPHTHALMIC 0.5 MG/ML
EMULSION OPHTHALMIC
COMMUNITY

## 2023-10-31 RX ORDER — TRIAMCINOLONE ACETONIDE 1 MG/G
CREAM TOPICAL 2 TIMES DAILY
COMMUNITY
Start: 2023-09-07

## 2023-10-31 RX ORDER — METHYLPREDNISOLONE ACETATE 80 MG/ML
INJECTION, SUSPENSION INTRA-ARTICULAR; INTRALESIONAL; INTRAMUSCULAR; SOFT TISSUE
COMMUNITY
Start: 2015-03-11

## 2023-10-31 RX ORDER — CLOTRIMAZOLE AND BETAMETHASONE DIPROPIONATE 10; .64 MG/G; MG/G
CREAM TOPICAL
COMMUNITY

## 2023-10-31 RX ORDER — FLUOCINOLONE ACETONIDE 0.11 MG/ML
OIL TOPICAL
COMMUNITY

## 2023-10-31 RX ORDER — METRONIDAZOLE 500 MG/1
TABLET ORAL
COMMUNITY

## 2023-10-31 RX ORDER — PROCHLORPERAZINE MALEATE 10 MG
TABLET ORAL
COMMUNITY

## 2023-10-31 RX ORDER — CLARITHROMYCIN 500 MG/1
500 TABLET, COATED ORAL 2 TIMES DAILY
COMMUNITY
Start: 2023-09-07

## 2023-10-31 RX ORDER — POTASSIUM CHLORIDE 750 MG/1
1 TABLET, FILM COATED, EXTENDED RELEASE ORAL DAILY
COMMUNITY
Start: 2015-04-07

## 2023-10-31 RX ORDER — ASPIRIN 81 MG/1
81 TABLET ORAL DAILY
COMMUNITY

## 2023-10-31 RX ORDER — TRAMADOL HYDROCHLORIDE 50 MG/1
TABLET ORAL
COMMUNITY

## 2023-10-31 NOTE — PROGRESS NOTES
[facility]  Consultation Note    Chief Complaint / Reason for Consultation  AAA    History of Present Illness  Patient is a 82 y.o. female referred today for an infrarenal aortic aneurysm. 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 She was evaluated by Dr. Dominique Mojica at [facility] and was told she was not a endograft candidate and that given her age she would be high risk for open surgical repair. They did not recommend follow-up per her report. She underwent ultrasound recently showing possible growth to 6 cm. She does have a left lower quadrant ostomy and has had some weakening in the area with bulging. Review of Systems     Denies fevers, chills, chest pain, shortness of breath, nausea, vomiting, hematemesis, diarrhea, constipation, melena, hematochezia, wt changes, vision problems, blindness, hearing problems, facial droop, slurred speech, extremity weakness, extremity numbness, dysuria. Past Medical History:   has a past medical history of Arthritis, Colostomy care ([facility]), Diverticulitis, and Hypertension. Past Surgical History:   has a past surgical history that includes Abdomen surgery; Appendectomy; and Hysterectomy.      Medications:  Current Outpatient Medications on File Prior to Visit   Medication Sig Dispense Refill    clarithromycin (BIAXIN) 500 MG tablet Take 1 tablet by mouth 2 times daily      methylPREDNISolone acetate (DEPO-MEDROL) 80 MG/ML injection       potassium bicarbonate (EFFER-K/K-LYTE) 25 MEQ disintegrating tablet DISSOLVE 1 TABLET IN 3-4 OUNCES OF COLD WATER AND TAKE TWICE DAILY      potassium chloride (KLOR-CON) 10 MEQ extended release tablet Take 1 tablet by mouth daily      triamcinolone (KENALOG) 0.1 % cream Apply topically 2 times daily      aspirin 81 MG EC tablet Take 1 tablet by mouth daily      clotrimazole-betamethasone (LOTRISONE) 1-0.05 % cream apply BID      diclofenac

## 2023-10-31 NOTE — TELEPHONE ENCOUNTER
Called in Prednisone 50 mg qty 3  Take 1 (13)  hours prior to CT scan  Take 1 (7) hours prior to CT scan  Take 1 (1) hour prior to CT scan along with 50 mg Benadryl. Instructions given to patient.

## 2024-01-10 NOTE — PATIENT INSTRUCTIONS
[facility]  [address]   [place], [place] [address]  Telephone: [phone]     FAX [phone]           Name: Jojo Alejandre  : 1935   AGE: 87 y.o.  MRN: [id number]  Today's Date: 2023     Ostomy skin care  Cleanse skin prior to applying a new pouch/wafer with:  yes - Water         yes - Cleanse skin with Mild Soap & Water  yes - May Shower    Other: Size- 1&3/8       Topical Treatments to skin around stoma:  Do not apply lotions, creams, or ointments to wound bed unless directed.   no - Apply barrier film/spray to skin around stoma and let it dry  no - Apply stoma powder to irritated skin around stoma, seal in with barrier film/spray and repeat x2  no - Apply antifungal cream to irritated skin surrounding stoma and work into skin until no longer able to feel cream.  no - Apply antifungal powder to irritated skin surrounding stoma, seal in with barrier film; and repeat x1  Other:      Pouch/Wafer Application     - Change your pouch every 3 days or when leaking.  yes - Appliance: 1 piece [employer]    yes - Product Numbers:[id number], Ring [id number]  yes - Other: Accessories: rings, barrier, powder, and film  Other:____________________________________     Application of Pouch  yes - Gather supplies needed to change pouch and wafer.  yes - Assemble new pouch system before removing old one.  yes - Using the measuring guide or pattern, trace size of opening onto back new pouch system.  yes - Cut out opening.  yes - Remove the control backing  yes - Set aside assembled pouch  yes - Remove worn appliance by gently pulling away from skin.  yes - Look at back of the pouch before throwing away to see how it is worn.   yes - Wash skin with warm water or _____________, rinse and pat dry.    yes - Inspect  skin for redness or irritation. Apply stoma powder to irritated skin around stoma, seal in with barrier film/spray and repeat x2- Make sure powder is very close to stoma.  yes - Apply  barrier seals

## 2024-01-11 ENCOUNTER — HOSPITAL ENCOUNTER (OUTPATIENT)
Dept: WOUND CARE | Age: 89
Discharge: HOME | End: 2024-01-11
Payer: MEDICARE

## 2024-01-11 VITALS
HEART RATE: 81 BPM | DIASTOLIC BLOOD PRESSURE: 96 MMHG | RESPIRATION RATE: 16 BRPM | TEMPERATURE: 96.2 F | SYSTOLIC BLOOD PRESSURE: 162 MMHG

## 2024-01-11 PROCEDURE — 99212 OFFICE O/P EST SF 10 MIN: CPT

## 2024-01-11 ASSESSMENT — PAIN - FUNCTIONAL ASSESSMENT: PAIN_FUNCTIONAL_ASSESSMENT: ACTIVITIES ARE NOT PREVENTED

## 2024-01-11 ASSESSMENT — PAIN DESCRIPTION - FREQUENCY: FREQUENCY: INTERMITTENT

## 2024-01-11 ASSESSMENT — PAIN SCALES - GENERAL: PAINLEVEL_OUTOF10: 3

## 2024-01-11 ASSESSMENT — PAIN DESCRIPTION - LOCATION: LOCATION: ABDOMEN

## 2024-01-11 ASSESSMENT — PAIN DESCRIPTION - ORIENTATION: ORIENTATION: ANTERIOR

## 2024-01-11 ASSESSMENT — PAIN DESCRIPTION - DESCRIPTORS: DESCRIPTORS: ACHING;BURNING

## 2024-01-11 ASSESSMENT — PAIN DESCRIPTION - PAIN TYPE: TYPE: CHRONIC PAIN

## 2024-01-11 NOTE — PLAN OF CARE
Discharge instructions given.  Patient verbalized understanding.  Return to [facility] in 1 week(s).  Continue Ostomy Care

## 2024-01-11 NOTE — PROGRESS NOTES
seals or rings around stoma or back of wafer.  yes - Apply wafer/pouch system over stoma and press down firmly starting around stoma and working outward.  yes - Remove control backing paper tape border if applicable and press to skin.      yes - Attach new pouch to wafer.  yes - Secure bottom of pouch.  yes - Apply barrier extenders to outside edges of pouch.   yes - Lay/Sit quietly for 15-20 min to allow adhesives to mold to skin.  Other: _____________________________________     Emptying Pouch; Colostomy or Urostomy  Colostomy:  yes - Empty  pouch when 1/3 full of gas, stool.  Clean bottom edge with damp toilet paper or bathroom wipe and close pouch.  no - For non-drainable pouch - remove when 1/2 full and throw away.  Clean wafer flange (ring) with toilet tissue or damp paper towel before reapplying new pouch  Urostomy:  yes - Empty  pouch when 1/3 full of urine     N/A - Urostomy:  Attach bottom connector of urostomy pouch to a nighttime drainage system and switch spigot to the open position.  N/A - Other: Urostomy: Care of nighttime drainage  ________________________________________________________________________     Other:  yes - Shower or Swim Care Pat pouch dry, Use hair dryer on cool setting to dry back of pouch and border, and Reapply barrier extenders or tape.  N/A - Odor Control with deodorizer into bottom of pouch after each emptying  N/A - Lubricating gel to pouch to help emptying of thick stool  Other: See additional instructions _______________________________________      Contact Ostomy Care Nurse Practitioner  leaking issues and skin irritation     [email or web address]   -to order under garments  Next time order pre-cut Ostomy      Follow up in 2 weeks with Jannie Ballesteros Np in the wound care center    Electronically signed by AYAN RALPH CNP on 1/11/2024 at 4:05 PM

## 2024-01-22 NOTE — PATIENT INSTRUCTIONS
[facility]  [address]   [place], [place] [address]  Telephone: [phone]     FAX [phone]           Name: Jojo Alejandre  : 1935   AGE: 87 y.o.  MRN: [id number]  Today's Date: 2023     Ostomy skin care  Cleanse skin prior to applying a new pouch/wafer with:  yes - Water         yes - Cleanse skin with Mild Soap & Water  yes - May Shower    Other: Size- 1&     Topical Treatments to skin around stoma:  Do not apply lotions, creams, or ointments to wound bed unless directed.   no - Apply barrier film/spray to skin around stoma and let it dry  no - Apply stoma powder to irritated skin around stoma, seal in with barrier film/spray and repeat x2  no - Apply antifungal cream to irritated skin surrounding stoma and work into skin until no longer able to feel cream.  no - Apply antifungal powder to irritated skin surrounding stoma, seal in with barrier film; and repeat x1  Other:      Pouch/Wafer Application     - Change your pouch every 3 days or when leaking.  yes - Appliance: 1 piece [employer]oplast    yes - Product Numbers:[id number], Ring [id number]  yes - Other: Accessories: rings, barrier, powder, and film  Other:____________________________________     Application of Pouch  yes - Gather supplies needed to change pouch and wafer.  yes - Assemble new pouch system before removing old one.  yes - Using the measuring guide or pattern, trace size of opening onto back new pouch system.  yes - Cut out opening.  yes - Remove the control backing  yes - Set aside assembled pouch  yes - Remove worn appliance by gently pulling away from skin.  yes - Look at back of the pouch before throwing away to see how it is worn.   yes - Wash skin with warm water or _____________, rinse and pat dry.    yes - Inspect  skin for redness or irritation. Apply film/spray   yes - Apply  barrier seals or rings around stoma or back of wafer.  yes - Apply wafer/pouch system over stoma and press down firmly starting

## 2024-01-25 ENCOUNTER — HOSPITAL ENCOUNTER (OUTPATIENT)
Dept: WOUND CARE | Age: 89
Discharge: HOME | End: 2024-01-25
Attending: NURSE PRACTITIONER
Payer: MEDICARE

## 2024-01-25 VITALS
WEIGHT: 137.4 LBS | HEART RATE: 70 BPM | TEMPERATURE: 96.6 F | DIASTOLIC BLOOD PRESSURE: 88 MMHG | HEIGHT: 63 IN | BODY MASS INDEX: 24.34 KG/M2 | SYSTOLIC BLOOD PRESSURE: 162 MMHG | RESPIRATION RATE: 16 BRPM

## 2024-01-25 PROCEDURE — 99212 OFFICE O/P EST SF 10 MIN: CPT

## 2024-01-25 NOTE — PLAN OF CARE
Discharge instructions given.  Patient verbalized understanding.  Return to [facility] in 1 week(s).  Continue with Ostomy Care

## 2024-01-26 NOTE — PROGRESS NOTES
Care Nurse Practitioner  leaking issues and skin irritation     [email or web address]   -to order under garments  Next time order pre-cut Ostomy wafers, ask or call [employer]      No Follow up  in the wound care center. Call if you need us    Electronically signed by AYAN RALPH CNP on 1/26/2024 at 11:43 AM

## 2024-02-27 ENCOUNTER — HOSPITAL ENCOUNTER (OUTPATIENT)
Dept: CT IMAGING | Age: 89
Discharge: HOME | End: 2024-02-27
Attending: SURGERY
Payer: MEDICARE

## 2024-02-27 DIAGNOSIS — I71.43 INFRARENAL ABDOMINAL AORTIC ANEURYSM (AAA) WITHOUT RUPTURE (HCC): ICD-10-CM

## 2024-02-27 LAB
CREAT SERPL-MCNC: 1.1 MG/DL (ref 0.6–1.2)
GFR SERPLBLD CREATININE-BSD FMLA CKD-EPI: 49 ML/MIN/{1.73_M2}

## 2024-02-27 PROCEDURE — 6360000004 HC RX CONTRAST MEDICATION: Performed by: SURGERY

## 2024-02-27 PROCEDURE — 74174 CTA ABD&PLVS W/CONTRAST: CPT

## 2024-02-27 PROCEDURE — 82565 ASSAY OF CREATININE: CPT

## 2024-02-27 PROCEDURE — 36415 COLL VENOUS BLD VENIPUNCTURE: CPT

## 2024-02-27 RX ADMIN — IOPAMIDOL 75 ML: 755 INJECTION, SOLUTION INTRAVENOUS at 08:55

## 2024-02-28 ENCOUNTER — TELEPHONE (OUTPATIENT)
Dept: VASCULAR SURGERY | Age: 89
End: 2024-02-28

## 2024-02-28 NOTE — TELEPHONE ENCOUNTER
Left message to call to discuss results of CTA.    CTA reviewed with Dr. Humphrey.  As per his previous note not a candidate for endograft and given age he does not think a candidate for open repair.  She can always get third opinion (had one already from Dr. Zambrano at [facility]) at  if she wants.

## 2024-03-19 ENCOUNTER — TELEPHONE (OUTPATIENT)
Dept: VASCULAR SURGERY | Age: 89
End: 2024-03-19

## 2024-06-17 ENCOUNTER — HOSPITAL ENCOUNTER (OUTPATIENT)
Dept: WOUND CARE | Age: 89
Discharge: HOME | End: 2024-06-17
Payer: MEDICARE

## 2024-06-17 VITALS
DIASTOLIC BLOOD PRESSURE: 85 MMHG | WEIGHT: 135.9 LBS | BODY MASS INDEX: 24.07 KG/M2 | HEART RATE: 66 BPM | RESPIRATION RATE: 15 BRPM | SYSTOLIC BLOOD PRESSURE: 179 MMHG

## 2024-06-17 PROBLEM — K94.00 COLOSTOMY COMPLICATION (HCC): Status: ACTIVE | Noted: 2024-06-17

## 2024-06-17 PROBLEM — Z93.3 COLOSTOMY STATUS (HCC): Status: ACTIVE | Noted: 2017-01-10

## 2024-06-17 PROCEDURE — 99212 OFFICE O/P EST SF 10 MIN: CPT

## 2024-06-17 PROCEDURE — 99214 OFFICE O/P EST MOD 30 MIN: CPT

## 2024-06-17 NOTE — PATIENT INSTRUCTIONS
[facility]  [address]   [place], [place] [address]  Telephone: [phone]     FAX [phone]           Name: Jojo Alejandre  : 1935   AGE: 87 y.o.  MRN: [id number]  Today's Date: 2023     Ostomy skin care  Cleanse skin prior to applying a new pouch/wafer with:  yes - Water         yes - Cleanse skin with Mild Soap & Water  yes - May Shower    Other: Size- 1&3/8     Topical Treatments to skin around stoma:  Do not apply lotions, creams, or ointments to wound bed unless directed.   no - Apply barrier film/spray to skin around stoma and let it dry  no - Apply stoma powder to irritated skin around stoma, seal in with barrier film/spray and repeat x2  no - Apply antifungal cream to irritated skin surrounding stoma and work into skin until no longer able to feel cream.  no - Apply antifungal powder to irritated skin surrounding stoma, seal in with barrier film; and repeat x1  Other:      Pouch/Wafer Application     - Change your pouch every 3 days or when leaking.  yes - Appliance: 1 piece [employer]oplast    yes - Product Numbers:[id number],   yes - Other: Accessories: rings, barrier, powder, and film  Other:____________________________________     Application of Pouch  yes - Gather supplies needed to change pouch and wafer.  yes - Assemble new pouch system before removing old one.  yes - Using the measuring guide or pattern, trace size of opening onto back new pouch system.  yes - Cut out opening.  yes - Remove the control backing  yes - Set aside assembled pouch  yes - Remove worn appliance by gently pulling away from skin.  yes - Look at back of the pouch before throwing away to see how it is worn.   yes - Wash skin with warm water or _____________, rinse and pat dry.    yes - Inspect  skin for redness or irritation. Apply film/spray   yes - Apply  barrier seals or rings around stoma or back of wafer.  yes - Apply wafer/pouch system over stoma and press down firmly starting around stoma and

## 2024-06-17 NOTE — PROGRESS NOTES
03:02 PM    CL 92 10/04/2022 03:02 PM    CO2 29 10/04/2022 03:02 PM    BUN 28 2023 11:30 AM    CREATININE 1.1 2024 07:47 AM     PT/INR: No results found for: \"PROTIME\", \"INR\"  Prealbumin: No results found for: \"PREALBUMIN\"  Albumin:No results found for: \"LABALBU\"  Sed Rate:No results found for: \"SEDRATE\"  Micro: No results found for: \"BC\"     Assessment   Complete pouch change performed by provider  Silver nitrate applied to affected areas on peristomal skin      Plan   Patient examined and evaluated    Plan for Ostomy Care:  Continue to use current ostomy supplies  Follow-up in 1 week  Time spent 25- minutes  Treatment: No orders of the defined types were placed in this encounter.    Please see attached Discharge Instructions    Written patient dismissal instructions given to patient and signed by patient or POA.         Patient Instructions   [facility] [place]  [address] [address]  Telephone: [phone]     FAX [phone]           Name: Jojo Alejandre  : 1935   AGE: 87 y.o.  MRN: [id number]  Today's Date: 2023     Ostomy skin care  Cleanse skin prior to applying a new pouch/wafer with:  yes - Water         yes - Cleanse skin with Mild Soap & Water  yes - May Shower    Other: Size- 1&3/8     Topical Treatments to skin around stoma:  Do not apply lotions, creams, or ointments to wound bed unless directed.   no - Apply barrier film/spray to skin around stoma and let it dry  no - Apply stoma powder to irritated skin around stoma, seal in with barrier film/spray and repeat x2  no - Apply antifungal cream to irritated skin surrounding stoma and work into skin until no longer able to feel cream.  no - Apply antifungal powder to irritated skin surrounding stoma, seal in with barrier film; and repeat x1  Other:      Pouch/Wafer Application     - Change your pouch every 3 days or when leaking.  yes - Appliance: 1 piece [employer]oplast    yes - Product Numbers:[id number],

## 2024-06-17 NOTE — PLAN OF CARE
Discharge instructions given.  Patient verbalized understanding.  Return to [facility] in 1 week(s).  Continue Ostomy Care

## 2024-06-27 NOTE — PATIENT INSTRUCTIONS
[facility]  [address]   [place], [place] [address]  Telephone: [phone]     FAX [phone]           Name: Jojo Alejandre  : 1935   AGE: 87 y.o.  MRN: [id number]  Today's Date: 2023     Ostomy skin care  Cleanse skin prior to applying a new pouch/wafer with:  yes - Water         yes - Cleanse skin with Mild Soap & Water  yes - May Shower    Other: Size- 1&3/8     Topical Treatments to skin around stoma:  Do not apply lotions, creams, or ointments to wound bed unless directed.   yes- Apply barrier film/spray to skin around stoma and let it dry  no - Apply stoma powder to irritated skin around stoma, seal in with barrier film/spray and repeat x2  no - Apply antifungal cream to irritated skin surrounding stoma and work into skin until no longer able to feel cream.  no - Apply antifungal powder to irritated skin surrounding stoma, seal in with barrier film; and repeat x1  Other:      Pouch/Wafer Application     - Change your pouch every 3 days or when leaking.  yes - Appliance: 1 piece [employer]    yes - Product Numbers:[id number],   yes - Other: Accessories: rings, barrier, powder, and film  Other:____________________________________     Application of Pouch  yes - Gather supplies needed to change pouch and wafer.  yes - Assemble new pouch system before removing old one.  yes - Using the measuring guide or pattern, trace size of opening onto back new pouch system.  yes - Cut out opening.  yes - Remove the control backing  yes - Set aside assembled pouch  yes - Remove worn appliance by gently pulling away from skin.  yes - Look at back of the pouch before throwing away to see how it is worn.   yes - Wash skin with warm water or _____________, rinse and pat dry.    yes - Inspect  skin for redness or irritation. Apply film/spray   yes - Apply  barrier seals or rings around stoma or back of wafer.  yes - Apply wafer/pouch system over stoma and press down firmly starting around stoma and

## 2024-07-08 ENCOUNTER — HOSPITAL ENCOUNTER (OUTPATIENT)
Dept: WOUND CARE | Age: 89
Discharge: HOME | End: 2024-07-08
Payer: MEDICARE

## 2024-07-08 VITALS
HEART RATE: 69 BPM | RESPIRATION RATE: 15 BRPM | HEIGHT: 63 IN | WEIGHT: 136.8 LBS | TEMPERATURE: 96.6 F | SYSTOLIC BLOOD PRESSURE: 152 MMHG | BODY MASS INDEX: 24.24 KG/M2 | DIASTOLIC BLOOD PRESSURE: 74 MMHG

## 2024-07-08 PROCEDURE — 99212 OFFICE O/P EST SF 10 MIN: CPT

## 2024-07-08 PROCEDURE — 99214 OFFICE O/P EST MOD 30 MIN: CPT

## 2024-07-08 NOTE — PROGRESS NOTES
[facility]  [address] [id number]  Telephone: [phone]      NAME:  Jojo Alejandre  MEDICAL RECORD NUMBER:  [id number]  AGE: 89 y.o.   GENDER:  female  :  1935  TODAY'S DATE:  2024    Subjective       Chief Complaint   Patient presents with    Wound Check     Colostomy         HISTORY of PRESENT ILLNESS HPI     Jojo Alejandre is a 89 y.o. female Established colostomy patient referred by patient, who presents today for ostomy/stoma evaluation.   Pouching/Skin Issues: Peristomal bleeding  Current Pouching System: Coloplast    History of Ostomy: Patient states she seen Jannie Ballesteros ostomy nurse practitioner in 2024 for peristomal bleeding.  Patient states she gets 2 to 3 days wear time and noticed bleeding upon removing old pouch.  Patient states she has had bleeding before in the past and silver nitrate was used to the area to control bleeding.  She states she has had her ostomy for 9 years and is having no other issues./Surgeon: Unknown    Wound/Ulcer Pain Timing/Severity: none  Quality of pain: N/A  Severity:  0 / 10   Modifying Factors: None  Associated Signs/Symptoms: none    2024: Patient endorses having good results following last visit without evidence of bleeding to peristomal skin following silver nitrate application.  Patient states approximately 2 weeks ago apply a smaller ostomy appliance by mistake which restarted peristomal bleeding at 6 o'clock.  Silver nitrate applied.  Patient tolerated procedure well.  No ostomy appliance changes needed.  Patient instructed to follow-up as needed as she is getting good ostomy wear time and having no other issues.    PAST MEDICAL HISTORY        Diagnosis Date    Arthritis     Colostomy care (HCC)     Diverticulitis     Hypertension        PAST SURGICAL HISTORY    Past Surgical History:   Procedure Laterality Date    ABDOMEN SURGERY      APPENDECTOMY      HYSTERECTOMY (CERVIX STATUS

## 2024-07-08 NOTE — PLAN OF CARE
Discharge instructions given.  Patient verbalized understanding.  Return to [facility] in 1 week(s).  Continue Ostomy Care

## 2024-07-18 NOTE — PATIENT INSTRUCTIONS
[facility]  [address]   [place], [place] [address]  Telephone: [phone]     FAX [phone]           Name: Jojo Alejandre  : 1935   AGE: 87 y.o.  MRN: [id number]  Today's Date: 2023     Ostomy skin care  Cleanse skin prior to applying a new pouch/wafer with:  yes - Water         yes - Cleanse skin with Mild Soap & Water  yes - May Shower    Other: Size- 1&3/8     Topical Treatments to skin around stoma:  Do not apply lotions, creams, or ointments to wound bed unless directed.   yes- Apply barrier film/spray to skin around stoma and let it dry  no - Apply stoma powder to irritated skin around stoma, seal in with barrier film/spray and repeat x2  no - Apply antifungal cream to irritated skin surrounding stoma and work into skin until no longer able to feel cream.  no - Apply antifungal powder to irritated skin surrounding stoma, seal in with barrier film; and repeat x1  Other:      Pouch/Wafer Application     - Change your pouch every 2 days or when leaking.  yes - Appliance: 1 piece   Use [employer] 8531  yes - Product Numbers:[id number]- adapt Ring  yes - Other: Accessories: rings, barrier, powder, and film  Other:____________________________________     Application of Pouch  yes - Gather supplies needed to change pouch and wafer.  yes - Assemble new pouch system before removing old one.  yes - Using the measuring guide or pattern, trace size of opening onto back new pouch system.  yes - Cut out opening.  yes - Remove the control backing  yes - Set aside assembled pouch  yes - Remove worn appliance by gently pulling away from skin. Use adhesive remover  yes - Look at back of the pouch before throwing away to see how it is worn.   yes - Wash skin with warm water or _____________, rinse and pat dry.    yes - Inspect  skin for redness or irritation. Apply Hydrofera Blue over wounds, Barrier film to surrounding stoma and wounds, Duoderm over top of Hydrofera Blue, Adapt Ring. [facility]

## 2024-07-22 ENCOUNTER — HOSPITAL ENCOUNTER (OUTPATIENT)
Dept: WOUND CARE | Age: 89
Discharge: HOME | End: 2024-07-22
Payer: MEDICARE

## 2024-07-22 VITALS — RESPIRATION RATE: 15 BRPM | HEART RATE: 69 BPM | DIASTOLIC BLOOD PRESSURE: 78 MMHG | SYSTOLIC BLOOD PRESSURE: 141 MMHG

## 2024-07-22 PROCEDURE — 87070 CULTURE OTHR SPECIMN AEROBIC: CPT

## 2024-07-22 PROCEDURE — 99212 OFFICE O/P EST SF 10 MIN: CPT

## 2024-07-22 PROCEDURE — 87205 SMEAR GRAM STAIN: CPT

## 2024-07-22 PROCEDURE — 99214 OFFICE O/P EST MOD 30 MIN: CPT

## 2024-07-22 PROCEDURE — 87077 CULTURE AEROBIC IDENTIFY: CPT

## 2024-07-22 PROCEDURE — 87186 SC STD MICRODIL/AGAR DIL: CPT

## 2024-07-22 RX ORDER — METRONIDAZOLE 500 MG/1
500 TABLET ORAL 2 TIMES DAILY
Qty: 14 TABLET | Refills: 0 | Status: SHIPPED | OUTPATIENT
Start: 2024-07-22 | End: 2024-07-29

## 2024-07-22 NOTE — PROGRESS NOTES
Face to Face Encounter    [facility] Fax # [phone]     [facility]  [address]  [facility] [address]  [phone]  Phone: [phone]  Fax# [phone]    Patient Information:      Jojo Alejandre  [address] [facility] [address]   [phone]   : 1935  AGE: 89 y.o.     GENDER: female   TODAYS DATE:  2024    Insurance:      PRIMARY INSURANCE:  Plan: MEDICARE PART A AND B  Coverage: MEDICARE  Effective Date: 2002  [id number] - (Medicare)      SECONDARY INSURANCE:  Plan: [employer]  Coverage: [employer]  Effective Date: 2024  [unfilled]  [unfilled]   [unfilled]     Vitals:    24 1107   BP: (!) 141/78   Pulse: 69   Resp: 15     Wt Readings from Last 1 Encounters:   24 62.1 kg (136 lb 12.8 oz)       Current Outpatient Medications   Medication Sig Dispense Refill    aspirin 81 MG EC tablet Take 1 tablet by mouth daily      diclofenac sodium (VOLTAREN) 1 % GEL       difluprednate (DUREZOL) 0.05 % EMUL       potassium bicarb-citric acid (EFFER-K) 10 MEQ TBEF effervescent tablet Take 1 tablet twice a day by oral route.      potassium bicarbonate (EFFER-K/K-LYTE) 25 MEQ disintegrating tablet DISSOLVE 1 TABLET IN 3-4 OUNCES OF COLD WATER AND TAKE TWICE DAILY      potassium chloride (KLOR-CON) 10 MEQ extended release tablet Take 1 tablet by mouth daily      triamcinolone (KENALOG) 0.1 % cream Apply topically 2 times daily      ALPRAZolam (XANAX) 0.5 MG tablet       amitriptyline (ELAVIL) 50 MG tablet amitriptyline 50 mg tablet   Take 1 tablet every day by oral route.      atenolol (TENORMIN) 50 MG tablet atenolol 50 mg tablet   TAKE 1 TABLET TWICE DAILY      cholestyramine (QUESTRAN) 4 g packet cholestyramine (with sugar) 4 gram powder for susp in a packet   DISSOLVE 1 PACKET AND TK PO BID  FOR 30 DAYS UTD      dicyclomine (BENTYL) 20 MG tablet Take 1 tablet by mouth 4 times daily (after meals and at bedtime)      magnesium oxide

## 2024-07-22 NOTE — PATIENT INSTRUCTIONS
[facility]  [address]   [place], [place] [address]  Telephone: [phone]     FAX [phone]           Name: Jojo Alejandre  : 1935   AGE: 87 y.o.  MRN: [id number]  Today's Date: 2023     Ostomy skin care  Cleanse skin prior to applying a new pouch/wafer with:  yes - Water         yes - Cleanse skin with Mild Soap & Water  yes - May Shower    Other: Size- 1&3/8     Topical Treatments to skin around stoma:  Do not apply lotions, creams, or ointments to wound bed unless directed.   yes- Apply barrier film/spray to skin around stoma and let it dry  no - Apply stoma powder to irritated skin around stoma, seal in with barrier film/spray and repeat x2  no - Apply antifungal cream to irritated skin surrounding stoma and work into skin until no longer able to feel cream.  no - Apply antifungal powder to irritated skin surrounding stoma, seal in with barrier film; and repeat x1  Other:      Pouch/Wafer Application     - Change your pouch every 2 days or when leaking.  yes - Appliance: 1 piece   Use [employer] 8531  yes - Product Numbers:[id number]- adapt Ring  yes - Other: Accessories: rings, barrier, powder, and film  Other:____________________________________     Application of Pouch  yes - Gather supplies needed to change pouch and wafer.  yes - Assemble new pouch system before removing old one.  yes - Using the measuring guide or pattern, trace size of opening onto back new pouch system.  yes - Cut out opening.  yes - Remove the control backing  yes - Set aside assembled pouch  yes - Remove worn appliance by gently pulling away from skin. Use adhesive remover  yes - Look at back of the pouch before throwing away to see how it is worn.   yes - Wash skin with warm water or _____________, rinse and pat dry.    yes - Inspect  skin for redness or irritation. Apply Hydrofera Blue over wounds, Barrier film to surrounding stoma and wounds, Duoderm over top of Hydrofera Blue, Adapt Ring. [facility]

## 2024-07-22 NOTE — PLAN OF CARE
Discharge instructions given.  Patient verbalized understanding.  Return to [facility] in 1 week(s).  Called/faxed orders to  Lab for culture,   Face to face to [facility]

## 2024-07-23 PROBLEM — L89.90 DECUBITUS ULCER: Status: ACTIVE | Noted: 2024-07-23

## 2024-07-23 PROBLEM — L02.211 ABSCESS OF SKIN OF ABDOMEN: Status: ACTIVE | Noted: 2017-01-27

## 2024-07-23 PROBLEM — L03.311 CELLULITIS OF ABDOMINAL WALL: Status: ACTIVE | Noted: 2024-07-23

## 2024-07-23 NOTE — PROGRESS NOTES
[facility]  [address] [id number]  Telephone: [phone]      NAME:  Jojo Alejandre  MEDICAL RECORD NUMBER:  [id number]  AGE: 89 y.o.   GENDER:  female  :  1935  TODAY'S DATE:  2024    Subjective       Chief Complaint   Patient presents with    Wound Check     Ostomy         HISTORY of PRESENT ILLNESS HPI     Jojo Alejandre is a 89 y.o. female Established colostomy patient referred by patient, who presents today for ostomy/stoma evaluation.   Pouching/Skin Issues: Peristomal bleeding  Current Pouching System: Coloplast    History of Ostomy: Patient states she seen Jannie Ballesteros ostomy nurse practitioner in 2024 for peristomal bleeding.  Patient states she gets 2 to 3 days wear time and noticed bleeding upon removing old pouch.  Patient states she has had bleeding before in the past and silver nitrate was used to the area to control bleeding.  She states she has had her ostomy for 9 years and is having no other issues./Surgeon: Unknown    Wound/Ulcer Pain Timing/Severity: none  Quality of pain: N/A  Severity:  0 / 10   Modifying Factors: None  Associated Signs/Symptoms: none    2024: Patient complaining of peristomal pain with drainage and large wound to the peristomal area.  Patient states pain started a few days ago and she noticed brown drainage dripping down her abdomen with increased abdominal swelling.  Pain rate 6 on a 0-to-10 scale, aching, constant, no relief.  Patient noted to have a peristomal abscess.  Wound culture obtained.  Oral antibiotic Bactrim DS started due to patient's multiple medication allergies.  Ostomy appliance changed to 1 piece Vernell all flexible with adapt barrier ring.  Peristomal wound and abscess opening covered with Hydrofera Blue and thin DuoDERM.  Will order home care to assist with pouch changes twice a week due to vision impairments and dexterity issues on Wednesday and Friday.  If home care

## 2024-07-25 ENCOUNTER — HOSPITAL ENCOUNTER (OUTPATIENT)
Dept: WOUND CARE | Age: 89
Discharge: HOME | End: 2024-07-25

## 2024-07-25 NOTE — PATIENT INSTRUCTIONS
tape to medial side and bottom  yes - Apply  barrier seals or rings around stoma or back of wafer.  yes - Apply wafer/pouch system over stoma and press down firmly starting around stoma and working outward.  yes - Remove control backing paper tape border if applicable and press to skin.      yes - Attach new pouch to wafer.  yes - Secure bottom of pouch.  yes - Apply barrier extenders to outside edges of pouch.   yes - Lay/Sit quietly for 15-20 min to allow adhesives to mold to skin.  Other: _____________________________________     Emptying Pouch; Colostomy or Urostomy  Colostomy:  yes - Empty  pouch when 1/3 full of gas, stool.  Clean bottom edge with damp toilet paper or bathroom wipe and close pouch.  no - For non-drainable pouch - remove when 1/2 full and throw away.  Clean wafer flange (ring) with toilet tissue or damp paper towel before reapplying new pouch  Urostomy:  yes - Empty  pouch when 1/3 full of urine     N/A - Urostomy:  Attach bottom connector of urostomy pouch to a nighttime drainage system and switch spigot to the open position.  N/A - Other: Urostomy: Care of nighttime drainage  ________________________________________________________________________     Other:  yes - Shower or Swim Care Pat pouch dry, Use hair dryer on cool setting to dry back of pouch and border, and Reapply barrier extenders or tape.  N/A - Odor Control with deodorizer into bottom of pouch after each emptying  N/A - Lubricating gel to pouch to help emptying of thick stool  Other: See additional instructions _______________________________________      Contact Ostomy Care Nurse Practitioner  leaking issues and skin irritation     [email or web address]   -to order under garments        Follow up  in the wound care center in 1 week with Duane Post

## 2024-07-26 LAB
BACTERIA SPEC AEROBE CULT: ABNORMAL
BACTERIA SPEC AEROBE CULT: ABNORMAL
GRAM STN SPEC: ABNORMAL
ORGANISM: ABNORMAL
ORGANISM: ABNORMAL

## 2024-07-29 ENCOUNTER — HOSPITAL ENCOUNTER (OUTPATIENT)
Dept: WOUND CARE | Age: 89
Discharge: HOME | End: 2024-07-29
Payer: MEDICARE

## 2024-07-29 VITALS — DIASTOLIC BLOOD PRESSURE: 73 MMHG | RESPIRATION RATE: 15 BRPM | HEART RATE: 73 BPM | SYSTOLIC BLOOD PRESSURE: 123 MMHG

## 2024-07-29 PROCEDURE — 99214 OFFICE O/P EST MOD 30 MIN: CPT

## 2024-07-29 PROCEDURE — 99212 OFFICE O/P EST SF 10 MIN: CPT

## 2024-07-29 NOTE — PROGRESS NOTES
[facility]  [address], [place] [address]  Telephone: [phone]      NAME:  Jojo Alejandre  MEDICAL RECORD NUMBER:  [id number]  AGE: 89 y.o.   GENDER:  female  :  1935  TODAY'S DATE:  2024    Subjective       Chief Complaint   Patient presents with    Wound Check     Ostomy         HISTORY of PRESENT ILLNESS HPI     Jojo Alejandre is a 89 y.o. female Established colostomy patient referred by patient, who presents today for ostomy/stoma evaluation.   Pouching/Skin Issues: Peristomal bleeding  Current Pouching System: Coloplast    History of Ostomy: Patient states she seen Jannie Ballesteors ostomy nurse practitioner in 2024 for peristomal bleeding.  Patient states she gets 2 to 3 days wear time and noticed bleeding upon removing old pouch. Patient states she has had bleeding before in the past and silver nitrate was used to the area to control bleeding.  She states she has had her ostomy for 9 years and is having no other issues./Surgeon: Unknown    Wound/Ulcer Pain Timing/Severity: none  Quality of pain: N/A  Severity:  0 / 10   Modifying Factors: None  Associated Signs/Symptoms: none    2024: No issues or concerns reported.  No overt signs of infection or constitutional issues.  Abdominal abscess resolved.  Wounds at 7 o'clock remain present.  Continue Hydrofera Blue ready over open wound with thin DuoDERM and ostomy appliance changed to Coloplast 1 piece as the patient explains difficulty emptying Merkel appliance due to dexterity and impaired vision.  Complete antibiotic course of Bactrim DS.  Home care assisting with complete pouch changes on Wednesday and .  Follow-up in 1 week.    2024: Patient complaining of peristomal pain with drainage and large wound to the peristomal area.  Patient states pain started a few days ago and she noticed brown drainage dripping down her abdomen with increased abdominal swelling.  Pain rate 6

## 2024-07-29 NOTE — PLAN OF CARE
Discharge instructions given.  Patient verbalized understanding.  Return to [facility] in 1 week(s).  Continue ostomy Care

## 2024-07-29 NOTE — PROGRESS NOTES
[facility] Fax # [phone]        Patient Instructions   [facility]  [address]   [place], [place] [address]  Telephone: [phone]     FAX [phone]           Name: Jojo Alejandre  : 1935   AGE: 87 y.o.  MRN: [id number]  Today's Date: 2023     Ostomy skin care  Cleanse skin prior to applying a new pouch/wafer with:  yes - Water         yes - Cleanse skin with Mild Soap & Water  yes - May Shower    Other: Size- 1&3/8     Topical Treatments to skin around stoma:  Do not apply lotions, creams, or ointments to wound bed unless directed.   yes- Apply barrier film/spray to skin around stoma and let it dry  no - Apply stoma powder to irritated skin around stoma, seal in with barrier film/spray and repeat x2  no - Apply antifungal cream to irritated skin surrounding stoma and work into skin until no longer able to feel cream.  no - Apply antifungal powder to irritated skin surrounding stoma, seal in with barrier film; and repeat x1  Other:      Pouch/Wafer Application     - Change your pouch every 2 days or when leaking.  yes - Appliance: 1 piece   Use Coloplast 64223  yes - Product Numbers:7805- adapt Ring  yes - Other: Accessories: rings, barrier, powder, and film  Other:____________________________________     Application of Pouch  yes - Gather supplies needed to change pouch and wafer.  yes - Assemble new pouch system before removing old one.  yes - Using the measuring guide or pattern, trace size of opening onto back new pouch system.  yes - Cut out opening.  yes - Remove the control backing  yes - Set aside assembled pouch  yes - Remove worn appliance by gently pulling away from skin. Use adhesive remover  yes - Look at back of the pouch before throwing away to see how it is worn.   yes - Wash skin with warm water or _____________, rinse and pat dry.    yes - Inspect  skin for redness or irritation. Apply Hydrofera Blue over wounds, Barrier film to surrounding stoma

## 2024-08-06 NOTE — PATIENT INSTRUCTIONS
[facility]  [address] [place], [place] [address]  Telephone: [phone]     FAX [phone]           Name: Jojo Alejandre  : 1935   AGE: 87 y.o.  MRN: [id number]  Today's Date: 2023     Ostomy skin care  Cleanse skin prior to applying a new pouch/wafer with:  yes - Water         yes - Cleanse skin with Mild Soap & Water  yes - May Shower    Other: Size- 1&3/8     Topical Treatments to skin around stoma:  Do not apply lotions, creams, or ointments to wound bed unless directed.   yes- Apply barrier film/spray to skin around stoma and let it dry  no - Apply stoma powder to irritated skin around stoma, seal in with barrier film/spray and repeat x2  no - Apply antifungal cream to irritated skin surrounding stoma and work into skin until no longer able to feel cream.  no - Apply antifungal powder to irritated skin surrounding stoma, seal in with barrier film; and repeat x1  Other:      Pouch/Wafer Application     - Change your pouch every 2 days or when leaking.  yes - Appliance: 1 piece   Use Coloplast 16528  yes - Product Numbers:7805- adapt Ring  yes - Other: Accessories: rings, barrier, powder, and film  Other:____________________________________     Application of Pouch  yes - Gather supplies needed to change pouch and wafer.  yes - Assemble new pouch system before removing old one.  yes - Using the measuring guide or pattern, trace size of opening onto back new pouch system.  yes - Cut out opening.  yes - Remove the control backing  yes - Set aside assembled pouch  yes - Remove worn appliance by gently pulling away from skin. Use adhesive remover  yes - Look at back of the pouch before throwing away to see how it is worn.   yes - Wash skin with warm water or _____________, rinse and pat dry.    yes - Inspect  skin for redness or irritation. Apply Hydrofera Blue over wounds, Barrier film to surrounding stoma and wounds, Duoderm over top of Hydrofera Blue, Adapt Ring. [facility] [place]

## 2024-08-08 ENCOUNTER — HOSPITAL ENCOUNTER (OUTPATIENT)
Dept: WOUND CARE | Age: 89
Discharge: HOME | End: 2024-08-08
Payer: MEDICARE

## 2024-08-08 VITALS
WEIGHT: 135.3 LBS | BODY MASS INDEX: 23.97 KG/M2 | HEIGHT: 63 IN | HEART RATE: 74 BPM | RESPIRATION RATE: 15 BRPM | SYSTOLIC BLOOD PRESSURE: 164 MMHG | DIASTOLIC BLOOD PRESSURE: 83 MMHG

## 2024-08-08 PROCEDURE — 99212 OFFICE O/P EST SF 10 MIN: CPT

## 2024-08-08 PROCEDURE — 99213 OFFICE O/P EST LOW 20 MIN: CPT

## 2024-08-08 NOTE — PROGRESS NOTES
[facility]  [address], [place] [address]  Telephone: [phone]      NAME:  Jojo Alejandre  MEDICAL RECORD NUMBER:  [id number]  AGE: 89 y.o.   GENDER:  female  :  1935  TODAY'S DATE:  2024    Subjective       Chief Complaint   Patient presents with    Wound Check     Colostomy         HISTORY of PRESENT ILLNESS HPI     Jojo Alejandre is a 89 y.o. female Established colostomy patient referred by patient, who presents today for ostomy/stoma evaluation.   Pouching/Skin Issues: Peristomal bleeding  Current Pouching System: Coloplast    History of Ostomy: Patient states she seen Jannie Ballesteros ostomy nurse practitioner in 2024 for peristomal bleeding.  Patient states she gets 2 to 3 days wear time and noticed bleeding upon removing old pouch. Patient states she has had bleeding before in the past and silver nitrate was used to the area to control bleeding.  She states she has had her ostomy for 9 years and is having no other issues./Surgeon: Unknown    Wound/Ulcer Pain Timing/Severity: none  Quality of pain: N/A  Severity:  0 / 10   Modifying Factors: None  Associated Signs/Symptoms: none    2024: No issues or concerns reported.  No overt signs of infection or constitutional issues.  Patient states she went back to old ostomy appliance with deep convexity because she is familiar with the pouch and the stool slides down back better.  Provider agreeable to pouch change back to old appliance.  Peristomal wound improved in size smaller.  Continue Hydrofera Blue ready to the peristomal skin until wound healed.  Home care services assisting with pouch changes due to dexterity issues and impaired vision.  Follow-up in 2 weeks.    2024: No issues or concerns reported.  No overt signs of infection or constitutional issues.  Abdominal abscess resolved.  Wounds at 7 o'clock remain present.  Continue Hydrofera Blue ready over open wound with thin DuoDERM

## 2024-08-08 NOTE — PROGRESS NOTES
[facility] Fax # [phone]        Patient Instructions   [facility]  [address]   [place], [place] [address]  Telephone: [phone]     FAX [phone]           Name: Jojo Alejandre  : 1935   AGE: 87 y.o.  MRN: [id number]  Today's Date: 2023     Ostomy skin care  Cleanse skin prior to applying a new pouch/wafer with:  yes - Water         yes - Cleanse skin with Mild Soap & Water  yes - May Shower    Other: Size- 1&3/8     Topical Treatments to skin around stoma:  Do not apply lotions, creams, or ointments to wound bed unless directed.   yes- Apply barrier film/spray to skin around stoma and let it dry  no - Apply stoma powder to irritated skin around stoma, seal in with barrier film/spray and repeat x2  no - Apply antifungal cream to irritated skin surrounding stoma and work into skin until no longer able to feel cream.  no - Apply antifungal powder to irritated skin surrounding stoma, seal in with barrier film; and repeat x1  Other:      Pouch/Wafer Application     - Change your pouch every 2 days or when leaking.  yes - Appliance: 1 piece   Use Coloplast 18250  yes - Product Numbers:7805- adapt Ring  yes - Other: Accessories: rings, barrier, powder, and film  Other:____________________________________     Application of Pouch  yes - Gather supplies needed to change pouch and wafer.  yes - Assemble new pouch system before removing old one.  yes - Using the measuring guide or pattern, trace size of opening onto back new pouch system.  yes - Cut out opening.  yes - Remove the control backing  yes - Set aside assembled pouch  yes - Remove worn appliance by gently pulling away from skin. Use adhesive remover  yes - Look at back of the pouch before throwing away to see how it is worn.   yes - Wash skin with warm water or _____________, rinse and pat dry.    yes - Inspect  skin for redness or irritation. Apply Hydrofera Blue over wounds, Barrier film to surrounding stoma

## 2024-08-08 NOTE — PLAN OF CARE
Discharge instructions given.  Patient verbalized understanding.  Return to [facility] in 2 week(s).  Continue Ostomy care

## 2024-08-20 NOTE — PATIENT INSTRUCTIONS
[facility]  [address] [place], [place] [address]  Telephone: [phone]     FAX [phone]           Name: Jojo Alejandre  : 1935   AGE: 87 y.o.  MRN: [id number]  Today's Date: 2023     Ostomy skin care  Cleanse skin prior to applying a new pouch/wafer with:  yes - Water         yes - Cleanse skin with Mild Soap & Water  yes - May Shower    Other: Size- 1&3/8     Topical Treatments to skin around stoma:  Do not apply lotions, creams, or ointments to wound bed unless directed.   yes- Apply barrier film/spray to skin around stoma and let it dry  no - Apply stoma powder to irritated skin around stoma, seal in with barrier film/spray and repeat x2  no - Apply antifungal cream to irritated skin surrounding stoma and work into skin until no longer able to feel cream.  no - Apply antifungal powder to irritated skin surrounding stoma, seal in with barrier film; and repeat x1  Other:      Pouch/Wafer Application     - Change your pouch every 2 days or when leaking.  yes - Appliance: 1 piece   Use Coloplast 48664  yes - Product Numbers:7805- adapt Ring  yes - Other: Accessories: rings, barrier, powder, and film  Other:____________________________________     Application of Pouch  yes - Gather supplies needed to change pouch and wafer.  yes - Assemble new pouch system before removing old one.  yes - Using the measuring guide or pattern, trace size of opening onto back new pouch system.  yes - Cut out opening.  yes - Remove the control backing  yes - Set aside assembled pouch  yes - Remove worn appliance by gently pulling away from skin. Use adhesive remover  yes - Look at back of the pouch before throwing away to see how it is worn.   yes - Wash skin with warm water or _____________, rinse and pat dry.    yes - Inspect  skin for redness or irritation. Apply Hydrofera Blue over wounds, Barrier film to surrounding stoma and wounds, Duoderm over top of Hydrofera Blue, Adapt Ring. [facility]

## 2024-08-22 ENCOUNTER — HOSPITAL ENCOUNTER (OUTPATIENT)
Dept: WOUND CARE | Age: 89
Discharge: HOME | End: 2024-08-22
Payer: MEDICARE

## 2024-08-22 VITALS
DIASTOLIC BLOOD PRESSURE: 83 MMHG | SYSTOLIC BLOOD PRESSURE: 164 MMHG | HEART RATE: 75 BPM | TEMPERATURE: 96.8 F | RESPIRATION RATE: 16 BRPM

## 2024-08-22 PROCEDURE — 99212 OFFICE O/P EST SF 10 MIN: CPT

## 2024-08-22 NOTE — PLAN OF CARE
Discharge instructions given.  Patient verbalized understanding.  Return to [facility] in 1 month.  Continue with [facility]

## 2024-08-23 NOTE — PROGRESS NOTES
[facility] Fax # [phone]        Patient Instructions   [facility]  [address]   [place], [place] [address]  Telephone: [phone]     FAX [phone]           Name: Jojo Alejandre  : 1935   AGE: 87 y.o.  MRN: [id number]  Today's Date: 2023     Ostomy skin care  Cleanse skin prior to applying a new pouch/wafer with:  yes - Water         yes - Cleanse skin with Mild Soap & Water  yes - May Shower    Other: Size- 1&3/8     Topical Treatments to skin around stoma:  Do not apply lotions, creams, or ointments to wound bed unless directed.   yes- Apply barrier film/spray to skin around stoma and let it dry  no - Apply stoma powder to irritated skin around stoma, seal in with barrier film/spray and repeat x2  no - Apply antifungal cream to irritated skin surrounding stoma and work into skin until no longer able to feel cream.  no - Apply antifungal powder to irritated skin surrounding stoma, seal in with barrier film; and repeat x1  Other:      Pouch/Wafer Application     - Change your pouch every 2 days or when leaking.  yes - Appliance: 1 piece   Use Coloplast 66208  yes - Product Numbers:7805- adapt Ring  yes - Other: Accessories: rings, barrier, powder, and film  Other:____________________________________     Application of Pouch  yes - Gather supplies needed to change pouch and wafer.  yes - Assemble new pouch system before removing old one.  yes - Using the measuring guide or pattern, trace size of opening onto back new pouch system.  yes - Cut out opening.  yes - Remove the control backing  yes - Set aside assembled pouch  yes - Remove worn appliance by gently pulling away from skin. Use adhesive remover  yes - Look at back of the pouch before throwing away to see how it is worn.   yes - Wash skin with warm water or _____________, rinse and pat dry.    yes - Inspect  skin for redness or irritation. Apply Hydrofera Blue over wounds, Barrier film to surrounding stoma 
with damp toilet paper or bathroom wipe and close pouch.  no - For non-drainable pouch - remove when 1/2 full and throw away.  Clean wafer flange (ring) with toilet tissue or damp paper towel before reapplying new pouch  Urostomy:  yes - Empty  pouch when 1/3 full of urine     N/A - Urostomy:  Attach bottom connector of urostomy pouch to a nighttime drainage system and switch spigot to the open position.  N/A - Other: Urostomy: Care of nighttime drainage  ________________________________________________________________________     Other:  yes - Shower or Swim Care Pat pouch dry, Use hair dryer on cool setting to dry back of pouch and border, and Reapply barrier extenders or tape.  N/A - Odor Control with deodorizer into bottom of pouch after each emptying  N/A - Lubricating gel to pouch to help emptying of thick stool  Other: See additional instructions ____Home care changing 3 times weekly until wound heals___________________________________      Contact Ostomy Care Nurse Practitioner  leaking issues and skin irritation     [email or web address]   -to order under garments     [facility]   Follow up  in the wound care center in 1 Month with Duane Post Np    Electronically signed by AYAN Obregon CNP on 8/22/2024 at 11:47 PM

## 2024-09-05 ENCOUNTER — HOSPITAL ENCOUNTER (OUTPATIENT)
Dept: ULTRASOUND IMAGING | Age: 89
Discharge: HOME | End: 2024-09-05
Attending: INTERNAL MEDICINE
Payer: MEDICARE

## 2024-09-05 DIAGNOSIS — R10.11 RIGHT UPPER QUADRANT PAIN: ICD-10-CM

## 2024-09-05 PROCEDURE — 76700 US EXAM ABDOM COMPLETE: CPT

## 2024-09-26 ENCOUNTER — HOSPITAL ENCOUNTER (OUTPATIENT)
Dept: WOUND CARE | Age: 89
Discharge: HOME | End: 2024-09-26
Payer: MEDICARE

## 2024-09-26 VITALS
SYSTOLIC BLOOD PRESSURE: 164 MMHG | RESPIRATION RATE: 16 BRPM | DIASTOLIC BLOOD PRESSURE: 83 MMHG | TEMPERATURE: 96 F | HEART RATE: 69 BPM

## 2024-09-26 PROBLEM — L30.9 PERISTOMAL DERMATITIS: Status: ACTIVE | Noted: 2024-09-26

## 2024-09-26 PROCEDURE — 99212 OFFICE O/P EST SF 10 MIN: CPT

## 2024-09-26 PROCEDURE — 99214 OFFICE O/P EST MOD 30 MIN: CPT

## 2024-09-26 ASSESSMENT — PAIN DESCRIPTION - ONSET: ONSET: ON-GOING

## 2024-09-26 ASSESSMENT — PAIN DESCRIPTION - FREQUENCY: FREQUENCY: INTERMITTENT

## 2024-09-26 ASSESSMENT — PAIN DESCRIPTION - LOCATION: LOCATION: ABDOMEN

## 2024-09-26 ASSESSMENT — PAIN - FUNCTIONAL ASSESSMENT: PAIN_FUNCTIONAL_ASSESSMENT: ACTIVITIES ARE NOT PREVENTED

## 2024-09-26 ASSESSMENT — PAIN DESCRIPTION - DESCRIPTORS: DESCRIPTORS: ACHING;BURNING

## 2024-09-26 ASSESSMENT — PAIN SCALES - GENERAL: PAINLEVEL_OUTOF10: 3

## 2024-10-29 NOTE — PATIENT INSTRUCTIONS
atient Instructions   [facility]  [address] [place], [place] [address]  Telephone: [phone]     FAX [phone]           Name: Jojo Alejandre  : 1935   AGE: 87 y.o.  MRN: [id number]  Today's Date: 2023     Ostomy skin care  Cleanse skin prior to applying a new pouch/wafer with:  yes - Water         yes - Cleanse skin with Mild Soap & Water  yes - May Shower    Other: Size- 1&3/8     Topical Treatments to skin around stoma:  Do not apply lotions, creams, or ointments to wound bed unless directed.   yes- Apply barrier film/spray to skin around stoma and let it dry  no - Apply stoma powder to irritated skin around stoma, seal in with barrier film/spray and repeat x2  no - Apply antifungal cream to irritated skin surrounding stoma and work into skin until no longer able to feel cream.  no - Apply antifungal powder to irritated skin surrounding stoma, seal in with barrier film; and repeat x1  Other:      Pouch/Wafer Application     - Change your pouch every 2 days or when leaking.  yes - Appliance: 1 piece   Use Coloplast 59793  yes - Product Numbers:  yes - Other: Accessories: rings, barrier, powder, and film  Other:____________________________________     Application of Pouch  yes - Gather supplies needed to change pouch and wafer.  yes - Assemble new pouch system before removing old one.  yes - Using the measuring guide or pattern, trace size of opening onto back new pouch system.  yes - Cut out opening.  yes - Remove the control backing  yes - Set aside assembled pouch  yes - Remove worn appliance by gently pulling away from skin. Use ADHESIVE REMOVER/ No Sting  yes - Look at back of the pouch before throwing away to see how it is worn.   yes - Wash skin with warm water or _____________, rinse and pat dry.    yes - Inspect  skin for redness or irritation. Apply Silver Alginate over wounds, Barrier film to surrounding stoma and wounds. Medipore tape to medial side and

## 2024-10-31 ENCOUNTER — HOSPITAL ENCOUNTER (OUTPATIENT)
Dept: WOUND CARE | Age: 89
Discharge: HOME | End: 2024-10-31
Payer: MEDICARE

## 2024-10-31 VITALS
WEIGHT: 130 LBS | HEIGHT: 63 IN | RESPIRATION RATE: 15 BRPM | HEART RATE: 73 BPM | SYSTOLIC BLOOD PRESSURE: 142 MMHG | DIASTOLIC BLOOD PRESSURE: 75 MMHG | BODY MASS INDEX: 23.04 KG/M2

## 2024-10-31 PROBLEM — K46.9 PERISTOMAL HERNIA: Status: ACTIVE | Noted: 2024-10-31

## 2024-10-31 PROCEDURE — 99212 OFFICE O/P EST SF 10 MIN: CPT

## 2024-10-31 NOTE — PLAN OF CARE
Discharge instructions given.  Patient verbalized understanding.  Return to [facility] in 1 week(s).  Continue Ostomy Care

## 2024-10-31 NOTE — PROGRESS NOTES
[facility]  [address], [place] [address]  Telephone: [phone]      NAME:  Jojo Alejandre  MEDICAL RECORD NUMBER:  [id number]  AGE: 89 y.o.   GENDER:  female  :  1935  TODAY'S DATE:  10/31/2024    Subjective       Chief Complaint   Patient presents with    Wound Check     Ostomy check         HISTORY of PRESENT ILLNESS HPI     Jojo Alejandre is a 89 y.o. female Established colostomy patient referred by patient, who presents today for ostomy/stoma evaluation.   Pouching/Skin Issues: Peristomal bleeding  Current Pouching System: Coloplast    History of Ostomy: Patient states she seen Jannie Ballesteros ostomy nurse practitioner in 2024 for peristomal bleeding.  Patient states she gets 2 to 3 days wear time and noticed bleeding upon removing old pouch. Patient states she has had bleeding before in the past and silver nitrate was used to the area to control bleeding.  She states she has had her ostomy for 9 years and is having no other issues./Surgeon: Unknown    Wound/Ulcer Pain Timing/Severity: none  Quality of pain: N/A  Severity:  0 / 10   Modifying Factors: None  Associated Signs/Symptoms: none    10/31/2024: Peristomal wound improved.  Silver nitrate wrote onto wound to control minimal bleeding.  Treatment changed to silver alginate with ostomy appliance and Medipore tape along appliance edges.  Continue home care to assist with pouch changes twice a week due to impaired vision and dexterity.  Follow-up in 1 month or sooner if issues arise.    2024: Peristomal bleeding wound noted.  Silver nitrate rolled onto wound to control bleeding.  Dressing changed to silver alginate with thin DuoDERM and no Bridget ring every other day.  Continue home care to assist with pouch changes 3 times a week due to impaired vision and dexterity.  Follow-up in 1 month or sooner if issues arise.    2024: No issues or concerns reported.  Peristomal wound healing

## 2024-10-31 NOTE — PROGRESS NOTES
[facility] Fax # [phone]        Patient Instructions     atient Instructions   [facility]  [address]   [place], [place] [address]  Telephone: [phone]     FAX [phone]           Name: Jojo Alejandre  : 1935   AGE: 87 y.o.  MRN: [id number]  Today's Date: 2023     Ostomy skin care  Cleanse skin prior to applying a new pouch/wafer with:  yes - Water         yes - Cleanse skin with Mild Soap & Water  yes - May Shower    Other: Size- 1&3/8     Topical Treatments to skin around stoma:  Do not apply lotions, creams, or ointments to wound bed unless directed.   yes- Apply barrier film/spray to skin around stoma and let it dry  no - Apply stoma powder to irritated skin around stoma, seal in with barrier film/spray and repeat x2  no - Apply antifungal cream to irritated skin surrounding stoma and work into skin until no longer able to feel cream.  no - Apply antifungal powder to irritated skin surrounding stoma, seal in with barrier film; and repeat x1  Other:      Pouch/Wafer Application     - Change your pouch every 2 days or when leaking.  yes - Appliance: 1 piece   Use Coloplast [id number]  yes - Product Numbers:  yes - Other: Accessories: rings, barrier, powder, and film  Other:____________________________________     Application of Pouch  yes - Gather supplies needed to change pouch and wafer.  yes - Assemble new pouch system before removing old one.  yes - Using the measuring guide or pattern, trace size of opening onto back new pouch system.  yes - Cut out opening.  yes - Remove the control backing  yes - Set aside assembled pouch  yes - Remove worn appliance by gently pulling away from skin. Use ADHESIVE REMOVER/ No Sting  yes - Look at back of the pouch before throwing away to see how it is worn.   yes - Wash skin with warm water or _____________, rinse and pat dry.    yes - Inspect  skin for redness or irritation. Apply Silver Alginate over wounds, Barrier film to

## 2024-12-02 NOTE — PATIENT INSTRUCTIONS
for Bleeding as neded  yes - Apply  barrier seals or rings around stoma or back of wafer.  yes - Apply wafer/pouch system over stoma and press down firmly starting around stoma and working outward.  yes - Remove control backing paper tape border if applicable and press to skin.      yes - Attach new pouch to wafer.  yes - Secure bottom of pouch.  yes - Apply barrier extenders to outside edges of pouch.   yes - Lay/Sit quietly for 15-20 min to allow adhesives to mold to skin.  Other: _____________________________________     Emptying Pouch; Colostomy or Urostomy  Colostomy:  yes - Empty  pouch when 1/3 full of gas, stool.  Clean bottom edge with damp toilet paper or bathroom wipe and close pouch.  no - For non-drainable pouch - remove when 1/2 full and throw away.  Clean wafer flange (ring) with toilet tissue or damp paper towel before reapplying new pouch  Urostomy:  yes - Empty  pouch when 1/3 full of urine     N/A - Urostomy:  Attach bottom connector of urostomy pouch to a nighttime drainage system and switch spigot to the open position.  N/A - Other: Urostomy: Care of nighttime drainage  ________________________________________________________________________     Other:  yes - Shower or Swim Care Pat pouch dry, Use hair dryer on cool setting to dry back of pouch and border, and Reapply barrier extenders or tape.  N/A - Odor Control with deodorizer into bottom of pouch after each emptying  N/A - Lubricating gel to pouch to help emptying of thick stool  Other: See additional instructions ____Home care changing 3 times weekly until wound heals___________________________________   Okay to use Afrin for bleeding as needed   Contact Ostomy Care Nurse Practitioner  leaking issues and skin irritation     [email or web address]   -to order under garments      [facility]   Follow up  in the wound care center in 1 Month with Duane Post Np

## 2024-12-05 ENCOUNTER — HOSPITAL ENCOUNTER (OUTPATIENT)
Dept: WOUND CARE | Age: 89
Discharge: HOME | End: 2024-12-05
Payer: MEDICARE

## 2024-12-05 VITALS
BODY MASS INDEX: 23.39 KG/M2 | HEART RATE: 71 BPM | DIASTOLIC BLOOD PRESSURE: 81 MMHG | RESPIRATION RATE: 16 BRPM | WEIGHT: 132 LBS | HEIGHT: 63 IN | SYSTOLIC BLOOD PRESSURE: 174 MMHG

## 2024-12-05 DIAGNOSIS — K46.9 PERISTOMAL HERNIA: Primary | ICD-10-CM

## 2024-12-05 DIAGNOSIS — K94.00 COLOSTOMY COMPLICATION (HCC): ICD-10-CM

## 2024-12-05 DIAGNOSIS — L30.9 PERISTOMAL DERMATITIS: ICD-10-CM

## 2024-12-05 DIAGNOSIS — Z43.3 COLOSTOMY CARE (HCC): ICD-10-CM

## 2024-12-05 PROCEDURE — 99212 OFFICE O/P EST SF 10 MIN: CPT

## 2024-12-05 NOTE — PLAN OF CARE
Discharge instructions given.  Patient verbalized understanding.  Return to [facility] in 1 week(s).  Continue Ostomy care

## 2024-12-06 NOTE — PROGRESS NOTES
[facility] Fax # [phone]        Patient Instructions   [facility]  [address]   [place], [place] [address]  Telephone: [phone]     FAX [phone]           Name: Jojo Alejandre  : 1935   AGE: 87 y.o.  MRN: [id number]  Today's Date: 2023     Ostomy skin care  Cleanse skin prior to applying a new pouch/wafer with:  yes - Water         yes - Cleanse skin with Mild Soap & Water  yes - May Shower    Other: Size- 1&3/8     Topical Treatments to skin around stoma:  Do not apply lotions, creams, or ointments to wound bed unless directed.   yes- Apply barrier film/spray to skin around stoma and let it dry  no - Apply stoma powder to irritated skin around stoma, seal in with barrier film/spray and repeat x2  no - Apply antifungal cream to irritated skin surrounding stoma and work into skin until no longer able to feel cream.  no - Apply antifungal powder to irritated skin surrounding stoma, seal in with barrier film; and repeat x1  Other:      Pouch/Wafer Application     - Change your pouch every 3-4   days or when leaking.  yes - Appliance: 1 piece   Use Coloplast [id number]  yes - Product Numbers:  yes - Other: Accessories: rings, barrier, powder, and film  Other:____________________________________     Application of Pouch  yes - Gather supplies needed to change pouch and wafer.  yes - Assemble new pouch system before removing old one.  yes - Using the measuring guide or pattern, trace size of opening onto back new pouch system.  yes - Cut out opening.  yes - Remove the control backing  yes - Set aside assembled pouch  yes - Remove worn appliance by gently pulling away from skin. Use ADHESIVE REMOVER/ No Sting  yes - Look at back of the pouch before throwing away to see how it is worn.   yes - Wash skin with warm water or _____________, rinse and pat dry.    yes - Inspect  skin for redness or irritation. Apply Silver Alginate over wounds, Barrier film to surrounding stoma 
examined and evaluated  Complete pouch change performed    Plan for Ostomy Care:  Ostomy appliance changed to 1 piece precut with convexity  0 complete pouch changes provided to patient at visit  Home care started [facility]  Follow-up in 1 month    Time spent 25- minutes    Please see attached Discharge Instructions    Written patient dismissal instructions given to patient and signed by patient or POA.         Patient Instructions   [facility]  [address]   [place], [place] [address]  Telephone: [phone]     FAX [phone]           Name: Jojo Alejandre  : 1935   AGE: 87 y.o.  MRN: [id number]  Today's Date: 2023     Ostomy skin care  Cleanse skin prior to applying a new pouch/wafer with:  yes - Water         yes - Cleanse skin with Mild Soap & Water  yes - May Shower    Other: Size- 1&3/8     Topical Treatments to skin around stoma:  Do not apply lotions, creams, or ointments to wound bed unless directed.   yes- Apply barrier film/spray to skin around stoma and let it dry  no - Apply stoma powder to irritated skin around stoma, seal in with barrier film/spray and repeat x2  no - Apply antifungal cream to irritated skin surrounding stoma and work into skin until no longer able to feel cream.  no - Apply antifungal powder to irritated skin surrounding stoma, seal in with barrier film; and repeat x1  Other:      Pouch/Wafer Application     - Change your pouch every 3-4   days or when leaking.  yes - Appliance: 1 piece   Use Coloplast 34084  yes - Product Numbers:  yes - Other: Accessories: rings, barrier, powder, and film  Other:____________________________________     Application of Pouch  yes - Gather supplies needed to change pouch and wafer.  yes - Assemble new pouch system before removing old one.  yes - Using the measuring guide or pattern, trace size of opening onto back new pouch system.  yes - Cut out opening.  yes - Remove the control backing  yes - Set

## 2024-12-30 ENCOUNTER — HOSPITAL ENCOUNTER (OUTPATIENT)
Age: 89
Discharge: HOME | End: 2024-12-30
Attending: INTERNAL MEDICINE
Payer: MEDICARE

## 2024-12-30 ENCOUNTER — HOSPITAL ENCOUNTER (OUTPATIENT)
Dept: CT IMAGING | Age: 89
Discharge: HOME | End: 2024-12-30
Attending: INTERNAL MEDICINE
Payer: MEDICARE

## 2024-12-30 DIAGNOSIS — R14.0 ABDOMINAL DISTENTION: ICD-10-CM

## 2024-12-30 LAB
ALBUMIN SERPL-MCNC: 4.3 G/DL (ref 3.4–5)
ALBUMIN/GLOB SERPL: 1.7 {RATIO} (ref 1.1–2.2)
ALP SERPL-CCNC: 72 U/L (ref 40–129)
ALT SERPL-CCNC: 12 U/L (ref 10–40)
ANION GAP SERPL CALCULATED.3IONS-SCNC: 13 MMOL/L (ref 3–16)
AST SERPL-CCNC: 23 U/L (ref 15–37)
BILIRUB SERPL-MCNC: 0.3 MG/DL (ref 0–1)
BUN SERPL-MCNC: 32 MG/DL (ref 7–20)
CALCIUM SERPL-MCNC: 9.5 MG/DL (ref 8.3–10.6)
CHLORIDE SERPL-SCNC: 85 MMOL/L (ref 99–110)
CO2 SERPL-SCNC: 30 MMOL/L (ref 21–32)
CREAT SERPL-MCNC: 1.3 MG/DL (ref 0.6–1.2)
DEPRECATED RDW RBC AUTO: 13.3 % (ref 12.4–15.4)
GFR SERPLBLD CREATININE-BSD FMLA CKD-EPI: 40 ML/MIN/{1.73_M2}
GLUCOSE SERPL-MCNC: 93 MG/DL (ref 70–99)
HCT VFR BLD AUTO: 41.4 % (ref 36–48)
HGB BLD-MCNC: 14.4 G/DL (ref 12–16)
MCH RBC QN AUTO: 34.3 PG (ref 26–34)
MCHC RBC AUTO-ENTMCNC: 34.7 G/DL (ref 31–36)
MCV RBC AUTO: 98.9 FL (ref 80–100)
PLATELET # BLD AUTO: 217 K/UL (ref 135–450)
PMV BLD AUTO: 8.7 FL (ref 5–10.5)
POTASSIUM SERPL-SCNC: 3.7 MMOL/L (ref 3.5–5.1)
PROT SERPL-MCNC: 6.9 G/DL (ref 6.4–8.2)
RBC # BLD AUTO: 4.19 M/UL (ref 4–5.2)
SODIUM SERPL-SCNC: 128 MMOL/L (ref 136–145)
WBC # BLD AUTO: 9.3 K/UL (ref 4–11)

## 2024-12-30 PROCEDURE — 85027 COMPLETE CBC AUTOMATED: CPT

## 2024-12-30 PROCEDURE — 36415 COLL VENOUS BLD VENIPUNCTURE: CPT

## 2024-12-30 PROCEDURE — 80053 COMPREHEN METABOLIC PANEL: CPT

## 2024-12-30 PROCEDURE — 74176 CT ABD & PELVIS W/O CONTRAST: CPT

## 2025-01-05 RX ORDER — ONDANSETRON 4 MG/1
4 TABLET, FILM COATED ORAL EVERY 12 HOURS PRN
Qty: 30 TABLET | Refills: 1 | Status: SHIPPED | OUTPATIENT
Start: 2025-01-05

## 2025-01-09 ENCOUNTER — HOSPITAL ENCOUNTER (OUTPATIENT)
Dept: WOUND CARE | Age: 89
Discharge: HOME | End: 2025-01-09
Payer: MEDICARE

## 2025-01-09 VITALS
SYSTOLIC BLOOD PRESSURE: 155 MMHG | BODY MASS INDEX: 23.09 KG/M2 | HEART RATE: 76 BPM | RESPIRATION RATE: 16 BRPM | DIASTOLIC BLOOD PRESSURE: 85 MMHG | HEIGHT: 63 IN | WEIGHT: 130.3 LBS

## 2025-01-09 PROCEDURE — 99213 OFFICE O/P EST LOW 20 MIN: CPT

## 2025-01-09 PROCEDURE — 99212 OFFICE O/P EST SF 10 MIN: CPT

## 2025-01-09 RX ORDER — TRAMADOL HYDROCHLORIDE 50 MG/1
50 TABLET ORAL EVERY 6 HOURS PRN
COMMUNITY

## 2025-01-09 NOTE — PATIENT INSTRUCTIONS
[facility]  [address]   [place], [place] [address]  Telephone: [phone]     FAX [phone]           Name: Jojo Alejandre  : 1935   AGE: 87 y.o.  MRN: [id number]  Today's Date: 2023     Ostomy skin care  Cleanse skin prior to applying a new pouch/wafer with:  yes - Water         yes - Cleanse skin with Mild Soap & Water  yes - May Shower    Other: Size- 1&3/8     Topical Treatments to skin around stoma:  Do not apply lotions, creams, or ointments to wound bed unless directed.   yes- Apply barrier film/spray to skin around stoma and let it dry  no - Apply stoma powder to irritated skin around stoma, seal in with barrier film/spray and repeat x2  no - Apply antifungal cream to irritated skin surrounding stoma and work into skin until no longer able to feel cream.  no - Apply antifungal powder to irritated skin surrounding stoma, seal in with barrier film; and repeat x1  Other:      Pouch/Wafer Application     - Change your pouch every 3-4   days or when leaking.  yes - Appliance: 1 piece   Use Coloplast 75002  yes - Product Numbers:  yes - Other: Accessories: rings, barrier, powder, and film  Other:____________________________________     Application of Pouch  yes - Gather supplies needed to change pouch and wafer.  yes - Assemble new pouch system before removing old one.  yes - Using the measuring guide or pattern, trace size of opening onto back new pouch system.  yes - Cut out opening.  yes - Remove the control backing  yes - Set aside assembled pouch  yes - Remove worn appliance by gently pulling away from skin. Use ADHESIVE REMOVER/ No Sting  yes - Look at back of the pouch before throwing away to see how it is worn.   yes - Wash skin with warm water or _____________, rinse and pat dry.    yes - Inspect  skin for redness or irritation. Apply Silver Alginate over wounds, Barrier film to surrounding stoma and wounds. Medipore tape to medial side and bottom.Okay to use Afrin

## 2025-01-09 NOTE — PROGRESS NOTES
[facility]  [address], [place] [address]  Telephone: [phone]      NAME:  Jojo Alejandre  MEDICAL RECORD NUMBER:  [id number]  AGE: 89 y.o.   GENDER:  female  :  1935  TODAY'S DATE:  2025    Subjective       Chief Complaint   Patient presents with    Wound Check     Colostomy         HISTORY of PRESENT ILLNESS HPI     Jojo Alejandre is a 89 y.o. female Established colostomy patient referred by patient, who presents today for ostomy/stoma evaluation.   Pouching/Skin Issues: Peristomal bleeding  Current Pouching System: Coloplast    History of Ostomy: Patient states she seen Jannie Ballesteros ostomy nurse practitioner in 2024 for peristomal bleeding.  Patient states she gets 2 to 3 days wear time and noticed bleeding upon removing old pouch. Patient states she has had bleeding before in the past and silver nitrate was used to the area to control bleeding.  She states she has had her ostomy for 9 years and is having no other issues./Surgeon: Unknown    Wound/Ulcer Pain Timing/Severity: none  Quality of pain: N/A  Severity:  0 / 10   Modifying Factors: None  Associated Signs/Symptoms: none    2025: Peristomal wound remains but improved with scar tissue.  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  Patient recently had a CT scan and her AAA has increased in size per patient.  Patient continues to refuse to change to more flexible or light convexity appliance as she states she has tried other ostomy appliances with poor success.  Discussed at length with the patient that her current ostomy appliance is rigid and pressing against peristomal hernia.  Silver nitrate rolled onto peristomal wound to control minimal bleeding.  Home care continuing to assist with ostomy appliance application due to poor vision and dexterity.

## 2025-01-27 NOTE — PROGRESS NOTES
Patient reached ____ yes  __X___ no         MY Chart message sent  _____  VM instructions left ___X_ yes   phone number __[phone]______                                ____ no-office notified          Date ___2/6/2025______  Time __1030_____  Arrival _0900   [facility]__/per office___    Nothing to eat or drink after midnight-follow your doctors prep instructions-this may include taking a second dose of your prep after midnight  Responsible adult 18 or older to stay on site while you are here-drive you home-stay with you after  Follow any instructions your doctors office has given you  Bring a complete list of all your medications and supplements including name,dose,how often taken the day of your procedure  If you normally take the following medications in the morning please do so the AM of your procedure with a small sip of water       Heart,blood pressure,seizure,thyroid or breathing medications-use your inhalers-bring any rescue inhalers with you DOS       DO NOT take blood pressure medications ending in \"rosie\" or \"pril\" the AM of procedure or evening prior  Dr Bonner patients are not to take any medications the AM of surgery and will be on clear liquids the day before  Take half or your normal dose of any long acting insulins the night before your procedure-do not take any diabetic medications the AM of procedure. If you take a weekly injection for diabetes or weight loss-do not take one week prior to surgery/procedure.If you have already taken your injection this week,contact your surgeon  Follow your doctors instructions regarding stopping or taking  any blood thinners-if you do not have instructions-call them  Any questions call your doctor  Other ______________________________________________________________                VISITOR POLICY(subject to change)             The current policy is 2 visitors per patient.There are no children allowed.Mask at discretion of facility. Visiting hours are 8a-8p.Overnight

## 2025-02-06 ENCOUNTER — ANESTHESIA (OUTPATIENT)
Dept: ENDOSCOPY | Age: 89
End: 2025-02-06
Payer: MEDICARE

## 2025-02-06 ENCOUNTER — ANESTHESIA EVENT (OUTPATIENT)
Dept: ENDOSCOPY | Age: 89
End: 2025-02-06
Payer: MEDICARE

## 2025-02-06 ENCOUNTER — HOSPITAL ENCOUNTER (OUTPATIENT)
Age: 89
Setting detail: OUTPATIENT SURGERY
Discharge: HOME | End: 2025-02-06
Attending: INTERNAL MEDICINE | Admitting: INTERNAL MEDICINE
Payer: MEDICARE

## 2025-02-06 VITALS
TEMPERATURE: 97.4 F | OXYGEN SATURATION: 96 % | DIASTOLIC BLOOD PRESSURE: 69 MMHG | SYSTOLIC BLOOD PRESSURE: 151 MMHG | HEIGHT: 63 IN | BODY MASS INDEX: 22.5 KG/M2 | WEIGHT: 127 LBS | RESPIRATION RATE: 16 BRPM | HEART RATE: 62 BPM

## 2025-02-06 DIAGNOSIS — R10.9 ABDOMINAL PAIN, UNSPECIFIED ABDOMINAL LOCATION: ICD-10-CM

## 2025-02-06 LAB
PERFORMED ON: ABNORMAL
POC SAMPLE TYPE: ABNORMAL
SODIUM BLD-SCNC: 134 MMOL/L (ref 136–145)

## 2025-02-06 PROCEDURE — 3700000000 HC ANESTHESIA ATTENDED CARE: Performed by: INTERNAL MEDICINE

## 2025-02-06 PROCEDURE — 7100000010 HC PHASE II RECOVERY - FIRST 15 MIN: Performed by: INTERNAL MEDICINE

## 2025-02-06 PROCEDURE — 2709999900 HC NON-CHARGEABLE SUPPLY: Performed by: INTERNAL MEDICINE

## 2025-02-06 PROCEDURE — 84295 ASSAY OF SERUM SODIUM: CPT

## 2025-02-06 PROCEDURE — 3700000001 HC ADD 15 MINUTES (ANESTHESIA): Performed by: INTERNAL MEDICINE

## 2025-02-06 PROCEDURE — 7100000001 HC PACU RECOVERY - ADDTL 15 MIN: Performed by: INTERNAL MEDICINE

## 2025-02-06 PROCEDURE — 7100000000 HC PACU RECOVERY - FIRST 15 MIN: Performed by: INTERNAL MEDICINE

## 2025-02-06 PROCEDURE — 6360000002 HC RX W HCPCS: Performed by: NURSE ANESTHETIST, CERTIFIED REGISTERED

## 2025-02-06 PROCEDURE — 3609012400 HC EGD TRANSORAL BIOPSY SINGLE/MULTIPLE: Performed by: INTERNAL MEDICINE

## 2025-02-06 PROCEDURE — 2580000003 HC RX 258: Performed by: STUDENT IN AN ORGANIZED HEALTH CARE EDUCATION/TRAINING PROGRAM

## 2025-02-06 PROCEDURE — 88305 TISSUE EXAM BY PATHOLOGIST: CPT

## 2025-02-06 PROCEDURE — 7100000011 HC PHASE II RECOVERY - ADDTL 15 MIN: Performed by: INTERNAL MEDICINE

## 2025-02-06 RX ORDER — PANTOPRAZOLE SODIUM 40 MG/1
40 TABLET, DELAYED RELEASE ORAL 2 TIMES DAILY
Qty: 60 TABLET | Refills: 4 | Status: SHIPPED | OUTPATIENT
Start: 2025-02-06

## 2025-02-06 RX ORDER — PROPOFOL 10 MG/ML
INJECTION, EMULSION INTRAVENOUS
Status: DISCONTINUED | OUTPATIENT
Start: 2025-02-06 | End: 2025-02-06 | Stop reason: SDUPTHER

## 2025-02-06 RX ORDER — SODIUM CHLORIDE 9 MG/ML
INJECTION, SOLUTION INTRAVENOUS CONTINUOUS
Status: DISCONTINUED | OUTPATIENT
Start: 2025-02-06 | End: 2025-02-06 | Stop reason: HOSPADM

## 2025-02-06 RX ORDER — LIDOCAINE HYDROCHLORIDE 20 MG/ML
INJECTION, SOLUTION INFILTRATION; PERINEURAL
Status: DISCONTINUED | OUTPATIENT
Start: 2025-02-06 | End: 2025-02-06 | Stop reason: SDUPTHER

## 2025-02-06 RX ADMIN — PROPOFOL 50 MG: 10 INJECTION, EMULSION INTRAVENOUS at 10:48

## 2025-02-06 RX ADMIN — PROPOFOL 120 MCG/KG/MIN: 10 INJECTION, EMULSION INTRAVENOUS at 10:49

## 2025-02-06 RX ADMIN — LIDOCAINE HYDROCHLORIDE 60 MG: 20 INJECTION, SOLUTION INFILTRATION; PERINEURAL at 10:48

## 2025-02-06 RX ADMIN — SODIUM CHLORIDE: 9 INJECTION, SOLUTION INTRAVENOUS at 10:20

## 2025-02-06 ASSESSMENT — PAIN - FUNCTIONAL ASSESSMENT
PAIN_FUNCTIONAL_ASSESSMENT: NONE - DENIES PAIN

## 2025-02-06 NOTE — ANESTHESIA POSTPROCEDURE EVALUATION
Department of Anesthesiology  Postprocedure Note    Patient: Jojo Alejandre  MRN: [id number]  YOB: 1935  Date of evaluation: 2/6/2025    Procedure Summary       Date: 02/06/25 Room / Location: [facility] 05 / [facility] [place]    Anesthesia Start: 1042 Anesthesia Stop: 1104    Procedure: ESOPHAGOGASTRODUODENOSCOPY BIOPSY (Abdomen) Diagnosis:       Abdominal pain, unspecified abdominal location      (Abdominal pain, unspecified abdominal location [R10.9])    Surgeons: Tanner Goldman MD Responsible Provider: Jt Isaac DO    Anesthesia Type: MAC ASA Status: 3            Anesthesia Type: No value filed.    Donavon Phase I: Donavon Score: 10    Donavon Phase II: Donavon Score: 10    Anesthesia Post Evaluation    Patient location during evaluation: PACU  Patient participation: complete - patient participated  Level of consciousness: awake and alert  Airway patency: patent  Nausea & Vomiting: no nausea  Cardiovascular status: hemodynamically stable  Respiratory status: acceptable  Hydration status: stable  Pain management: adequate    No notable events documented.

## 2025-02-06 NOTE — ANESTHESIA PRE PROCEDURE
Department of Anesthesiology  Preprocedure Note       Name:  Jojo Alejandre   Age:  89 y.o.  :  1935                                          MRN:  [id number]         Date:  2025      Surgeon: Surgeon(s):  Tanner Goldman MD    Procedure: Procedure(s):  ESOPHAGOGASTRODUODENOSCOPY DIAGNOSTIC ONLY    Medications prior to admission:   Prior to Admission medications    Medication Sig Start Date End Date Taking? Authorizing Provider   traMADol (ULTRAM) 50 MG tablet Take 1 tablet by mouth every 6 hours as needed for Pain.   Yes Miri Roque MD   ALPRAZolam (XANAX) 0.5 MG tablet 3 times daily as needed for Anxiety. 23  Yes Miri Roque MD   amitriptyline (ELAVIL) 50 MG tablet amitriptyline 50 mg tablet   Take 1 tablet every day by oral route. 22  Yes Miri Roque MD   atenolol (TENORMIN) 50 MG tablet atenolol 50 mg tablet   TAKE 1 TABLET TWICE DAILY 22  Yes Miri Roque MD   magnesium oxide (MAG-OX) 400 MG tablet magnesium oxide 400 mg (241.3 mg magnesium) tablet   TK 1 T PO  BID   Yes Miri Roque MD   triamterene-hydroCHLOROthiazide (MAXZIDE) 75-50 MG per tablet daily 22  Yes Miri Roque MD   ondansetron (ZOFRAN) 4 MG tablet Take 1 tablet by mouth every 12 hours as needed for Nausea or Vomiting 25   Tanner Goldman MD   aspirin 81 MG EC tablet Take 1 tablet by mouth daily  Patient not taking: Reported on 2025    Miri Roque MD   diclofenac sodium (VOLTAREN) 1 % GEL     Miri Roque MD   difluprednate (DUREZOL) 0.05 % EMUL     Miri Roque MD   potassium bicarb-citric acid (EFFER-K) 10 MEQ TBEF effervescent tablet Take 1 tablet twice a day by oral route.  Patient not taking: Reported on 2025    Miri Roque MD   potassium bicarbonate (EFFER-K/K-LYTE) 25 MEQ disintegrating tablet DISSOLVE 1 TABLET IN 3-4 OUNCES OF COLD WATER AND TAKE TWICE DAILY 8/14/15   Miri Roque MD   potassium

## 2025-02-06 NOTE — H&P
Gastroenterology Note             Pre-operative History and Physical    Patient: Jojo Alejandre  : 1935  CSN:     History Obtained From:  patient and/or guardian.     HISTORY OF PRESENT ILLNESS:    The patient is a 89 y.o. female  here for EGD  This a very pleasant 89-year-old female comes in today for upper endoscopy she has been having lots problems with abdominal discomfort dyspepsia we have done a CT scan which has not shown anything except for her abdominal aortic aneurysm    She does have a hernia but it is not something that is surgically needed to repair at this particular point    Also her abdominal aortic aneurysm is not surgically repaired    Past Medical History:    Past Medical History:   Diagnosis Date    Anxiety     Arthritis     Colostomy care (HCC)     Diverticulitis     Hypertension      Past Surgical History:    Past Surgical History:   Procedure Laterality Date    ABDOMEN SURGERY      APPENDECTOMY      COLOSTOMY      HYSTERECTOMY (CERVIX STATUS UNKNOWN)       Medications Prior to Admission:   No current facility-administered medications on file prior to encounter.     Current Outpatient Medications on File Prior to Encounter   Medication Sig Dispense Refill    traMADol (ULTRAM) 50 MG tablet Take 1 tablet by mouth every 6 hours as needed for Pain.      ALPRAZolam (XANAX) 0.5 MG tablet 3 times daily as needed for Anxiety.      amitriptyline (ELAVIL) 50 MG tablet amitriptyline 50 mg tablet   Take 1 tablet every day by oral route.      atenolol (TENORMIN) 50 MG tablet atenolol 50 mg tablet   TAKE 1 TABLET TWICE DAILY      magnesium oxide (MAG-OX) 400 MG tablet magnesium oxide 400 mg (241.3 mg magnesium) tablet   TK 1 T PO  BID      triamterene-hydroCHLOROthiazide (MAXZIDE) 75-50 MG per tablet daily      ondansetron (ZOFRAN) 4 MG tablet Take 1 tablet by mouth every 12 hours as needed for Nausea or Vomiting 30 tablet 1    aspirin 81 MG EC tablet Take 1 tablet by mouth daily (Patient not

## 2025-02-13 ENCOUNTER — HOSPITAL ENCOUNTER (OUTPATIENT)
Dept: WOUND CARE | Age: 89
Discharge: HOME | End: 2025-02-13
Payer: MEDICARE

## 2025-02-13 VITALS — SYSTOLIC BLOOD PRESSURE: 166 MMHG | RESPIRATION RATE: 15 BRPM | HEART RATE: 78 BPM | DIASTOLIC BLOOD PRESSURE: 89 MMHG

## 2025-02-13 DIAGNOSIS — K46.9 PERISTOMAL HERNIA: Primary | ICD-10-CM

## 2025-02-13 DIAGNOSIS — Z43.3 COLOSTOMY CARE (HCC): ICD-10-CM

## 2025-02-13 DIAGNOSIS — L30.9 PERISTOMAL DERMATITIS: ICD-10-CM

## 2025-02-13 DIAGNOSIS — Z93.3 COLOSTOMY STATUS (HCC): ICD-10-CM

## 2025-02-13 PROCEDURE — 99212 OFFICE O/P EST SF 10 MIN: CPT

## 2025-02-13 PROCEDURE — 99213 OFFICE O/P EST LOW 20 MIN: CPT

## 2025-02-13 RX ORDER — NICOTINE 14MG/24HR
PATCH, TRANSDERMAL 24 HOURS TRANSDERMAL 2 TIMES DAILY
COMMUNITY

## 2025-02-13 NOTE — PLAN OF CARE
Discharge instructions given.  Patient verbalized understanding.  Return to [facility] in 4 week(s).  Called/faxed orders to  [facility]

## 2025-02-13 NOTE — PATIENT INSTRUCTIONS
[facility]  [address]   [place], [place] [address]  Telephone: [phone]     FAX [phone]           Name: Jojo Alejandre  : 1935   AGE: 87 y.o.  MRN: [id number]  Today's Date: 2023     Ostomy skin care  Cleanse skin prior to applying a new pouch/wafer with:  yes - Water         yes - Cleanse skin with Mild Soap & Water  yes - May Shower    Other: Size- 1&3/8     Topical Treatments to skin around stoma:  Do not apply lotions, creams, or ointments to wound bed unless directed.   yes- Apply barrier film/spray to skin around stoma and let it dry  no - Apply stoma powder to irritated skin around stoma, seal in with barrier film/spray and repeat x2  no - Apply antifungal cream to irritated skin surrounding stoma and work into skin until no longer able to feel cream.  no - Apply antifungal powder to irritated skin surrounding stoma, seal in with barrier film; and repeat x1  Other:      Pouch/Wafer Application     - Change your pouch every 3-4   days or when leaking.  yes - Appliance: 1 piece   Use Coloplast 10041  yes - Product Numbers:  yes - Other: Accessories: rings, barrier, powder, and film  Other:____________________________________     Application of Pouch  yes - Gather supplies needed to change pouch and wafer.  yes - Assemble new pouch system before removing old one.  yes - Using the measuring guide or pattern, trace size of opening onto back new pouch system.  yes - Cut out opening.  yes - Remove the control backing  yes - Set aside assembled pouch  yes - Remove worn appliance by gently pulling away from skin. Use ADHESIVE REMOVER/ No Sting  yes - Look at back of the pouch before throwing away to see how it is worn.   yes - Wash skin with warm water or _____________, rinse and pat dry.    yes - Inspect  skin for redness or irritation. Apply Silver Alginate over wounds -( only use Silver Ag when needed for irritation), Barrier film and stoma powder to surrounding stoma.

## 2025-02-14 NOTE — PROGRESS NOTES
[facility] Fax # [phone]        Patient Instructions   [facility]  [address]   [place], [place] [address]  Telephone: [phone]     FAX [phone]           Name: Jojo Alejandre  : 1935   AGE: 87 y.o.  MRN: [id number]  Today's Date: 2023     Ostomy skin care  Cleanse skin prior to applying a new pouch/wafer with:  yes - Water         yes - Cleanse skin with Mild Soap & Water  yes - May Shower    Other: Size- 1&3/8     Topical Treatments to skin around stoma:  Do not apply lotions, creams, or ointments to wound bed unless directed.   yes- Apply barrier film/spray to skin around stoma and let it dry  no - Apply stoma powder to irritated skin around stoma, seal in with barrier film/spray and repeat x2  no - Apply antifungal cream to irritated skin surrounding stoma and work into skin until no longer able to feel cream.  no - Apply antifungal powder to irritated skin surrounding stoma, seal in with barrier film; and repeat x1  Other:      Pouch/Wafer Application     - Change your pouch every 3-4   days or when leaking.  yes - Appliance: 1 piece   Use Coloplast 70489  yes - Product Numbers:  yes - Other: Accessories: rings, barrier, powder, and film  Other:____________________________________     Application of Pouch  yes - Gather supplies needed to change pouch and wafer.  yes - Assemble new pouch system before removing old one.  yes - Using the measuring guide or pattern, trace size of opening onto back new pouch system.  yes - Cut out opening.  yes - Remove the control backing  yes - Set aside assembled pouch  yes - Remove worn appliance by gently pulling away from skin. Use ADHESIVE REMOVER/ No Sting  yes - Look at back of the pouch before throwing away to see how it is worn.   yes - Wash skin with warm water or _____________, rinse and pat dry.    yes - Inspect  skin for redness or irritation. Apply Silver Alginate over wounds -( only use Silver Ag when needed for 
and border, and Reapply barrier extenders or tape.  N/A - Odor Control with deodorizer into bottom of pouch after each emptying  N/A - Lubricating gel to pouch to help emptying of thick stool  Other: See additional instructions ____Home care changing 3 times weekly until wound heals___________________________________   Okay to use Afrin for bleeding as needed   Contact Ostomy Care Nurse Practitioner  leaking issues and skin irritation   Call [employer] and tell them Duane will sign for supplies as her doctor is on vacation  Ostomy"[employer]   -to order under garments      [facility]   Follow up  in the wound care center in 1 Month with Duane Post Np           Electronically signed by AYAN Obregon CNP on 2/14/2025 at 1:10 PM

## 2025-02-27 ENCOUNTER — HOSPITAL ENCOUNTER (OUTPATIENT)
Dept: WOUND CARE | Age: 89
Discharge: HOME | End: 2025-02-27
Payer: MEDICARE

## 2025-02-27 VITALS
RESPIRATION RATE: 15 BRPM | WEIGHT: 131 LBS | BODY MASS INDEX: 23.21 KG/M2 | HEIGHT: 63 IN | HEART RATE: 69 BPM | SYSTOLIC BLOOD PRESSURE: 156 MMHG | DIASTOLIC BLOOD PRESSURE: 75 MMHG

## 2025-02-27 DIAGNOSIS — L30.9 PERISTOMAL DERMATITIS: ICD-10-CM

## 2025-02-27 DIAGNOSIS — K94.00 COLOSTOMY COMPLICATION (HCC): ICD-10-CM

## 2025-02-27 DIAGNOSIS — K46.9 PERISTOMAL HERNIA: Primary | ICD-10-CM

## 2025-02-27 PROCEDURE — 99213 OFFICE O/P EST LOW 20 MIN: CPT

## 2025-02-27 PROCEDURE — 99212 OFFICE O/P EST SF 10 MIN: CPT

## 2025-02-27 NOTE — PLAN OF CARE
Discharge instructions given.  Patient verbalized understanding.  Return to [facility] in 2 week(s).  Continue Ostomy care

## 2025-02-27 NOTE — PROGRESS NOTES
[facility] Fax # [phone]        Patient Instructions   [facility]  [address]   [place], [place] [address]  Telephone: [phone]     FAX [phone]           Name: Jojo Alejandre  : 1935   AGE: 87 y.o.  MRN: [id number]  Today's Date: 2023     Ostomy skin care  Cleanse skin prior to applying a new pouch/wafer with:  yes - Water         yes - Cleanse skin with Mild Soap & Water  yes - May Shower    Other: Size- 1&3/8     Topical Treatments to skin around stoma:  Do not apply lotions, creams, or ointments to wound bed unless directed.   yes- Apply barrier film/spray to skin around stoma and let it dry  no - Apply stoma powder to irritated skin around stoma, seal in with barrier film/spray and repeat x2  no - Apply antifungal cream to irritated skin surrounding stoma and work into skin until no longer able to feel cream.  no - Apply antifungal powder to irritated skin surrounding stoma, seal in with barrier film; and repeat x1  Other:      Pouch/Wafer Application     - Change your pouch every 2  days or when leaking.  yes - Appliance: 1 piece   Use Coloplast [id number]  yes - Product Numbers:  yes - Other: Accessories: rings, barrier, powder, and film  Other:____________________________________     Application of Pouch  yes - Gather supplies needed to change pouch and wafer.  yes - Assemble new pouch system before removing old one.  yes - Using the measuring guide or pattern, trace size of opening onto back new pouch system.  yes - Cut out opening.  yes - Remove the control backing  yes - Set aside assembled pouch  yes - Remove worn appliance by gently pulling away from skin. Use ADHESIVE REMOVER/ No Sting  yes - Look at back of the pouch before throwing away to see how it is worn.   yes - Wash skin with warm water or _____________, rinse and pat dry.    yes - Inspect  skin for redness or irritation. Barrier Film around stoma. Apply Silver Alginate over wound and Exuderm

## 2025-02-27 NOTE — PATIENT INSTRUCTIONS
[facility]  [address]   [place], [place] [address]  Telephone: [phone]     FAX [phone]           Name: Jojo Alejandre  : 1935   AGE: 87 y.o.  MRN: [id number]  Today's Date: 2023     Ostomy skin care  Cleanse skin prior to applying a new pouch/wafer with:  yes - Water         yes - Cleanse skin with Mild Soap & Water  yes - May Shower    Other: Size- 1&3/8     Topical Treatments to skin around stoma:  Do not apply lotions, creams, or ointments to wound bed unless directed.   yes- Apply barrier film/spray to skin around stoma and let it dry  no - Apply stoma powder to irritated skin around stoma, seal in with barrier film/spray and repeat x2  no - Apply antifungal cream to irritated skin surrounding stoma and work into skin until no longer able to feel cream.  no - Apply antifungal powder to irritated skin surrounding stoma, seal in with barrier film; and repeat x1  Other:      Pouch/Wafer Application     - Change your pouch every 2  days or when leaking.  yes - Appliance: 1 piece   Use Coloplast 55375  yes - Product Numbers:  yes - Other: Accessories: rings, barrier, powder, and film  Other:____________________________________     Application of Pouch  yes - Gather supplies needed to change pouch and wafer.  yes - Assemble new pouch system before removing old one.  yes - Using the measuring guide or pattern, trace size of opening onto back new pouch system.  yes - Cut out opening.  yes - Remove the control backing  yes - Set aside assembled pouch  yes - Remove worn appliance by gently pulling away from skin. Use ADHESIVE REMOVER/ No Sting  yes - Look at back of the pouch before throwing away to see how it is worn.   yes - Wash skin with warm water or _____________, rinse and pat dry.    yes - Inspect  skin for redness or irritation. Barrier Film around stoma. Apply Silver Alginate over wound and Exuderm over silver Alginate and wounds,     Okay to use Afrin for Bleeding as

## 2025-02-27 NOTE — PROGRESS NOTES
[facility]  [address] [place], [place] [address]  Telephone: [phone]      NAME:  Jojo Alejandre  MEDICAL RECORD NUMBER:  [id number]  AGE: 89 y.o.   GENDER:  female  :  1935  TODAY'S DATE:  2025    Subjective       Chief Complaint   Patient presents with    Wound Check     Returning- Colostomy         HISTORY of PRESENT ILLNESS HPI     Jojo Alejandre is a 89 y.o. female Established colostomy patient referred by patient, who presents today for ostomy/stoma evaluation.   Pouching/Skin Issues: Peristomal bleeding  Current Pouching System: Coloplast    History of Ostomy: Patient states she seen Jannie Ballesteros ostomy nurse practitioner in 2024 for peristomal bleeding.  Patient states she gets 2 to 3 days wear time and noticed bleeding upon removing old pouch. Patient states she has had bleeding before in the past and silver nitrate was used to the area to control bleeding.  She states she has had her ostomy for 9 years and is having no other issues./Surgeon: Unknown    Wound/Ulcer Pain Timing/Severity: none  Quality of pain: N/A  Severity:  0 / 10   Modifying Factors: None  Associated Signs/Symptoms: none    2025: Patient concerned about reopening of peristomal wound as at last visit this area was closed.  No issues with leaking but patient describes mild discomfort at open area left of stoma.  Silver nitrate rolled onto bleeding areas for cauterization.  Will restart alginate with silver secured with thin DuoDERM change pouch every 2 days.  Patient to follow-up in 2 weeks.    2025: No issues or concerns reported.  Peristomal skin significantly improved.  No silver alginate or cauterization needed at today's visit.  Home care continuing to assist with ostomy appliance application due to impaired vision and dexterity.  Follow-up in 1 month.    2025: Peristomal wound remains but improved with scar tissue.  Patient states her gastroenterologist

## 2025-03-10 NOTE — PATIENT INSTRUCTIONS
[facility]  [address]   [place], [place] [address]  Telephone: [phone]     FAX [phone]           Name: Jojo Alejandre  : 1935   AGE: 87 y.o.  MRN: [id number]  Today's Date: 2023     Ostomy skin care  Cleanse skin prior to applying a new pouch/wafer with:  yes - Water         yes - Cleanse skin with Mild Soap & Water  yes - May Shower    Other: Size- 1&3/8     Topical Treatments to skin around stoma:  Do not apply lotions, creams, or ointments to wound bed unless directed.   yes- Apply barrier film/spray to skin around stoma and let it dry  no - Apply stoma powder to irritated skin around stoma, seal in with barrier film/spray and repeat x2  no - Apply antifungal cream to irritated skin surrounding stoma and work into skin until no longer able to feel cream.  no - Apply antifungal powder to irritated skin surrounding stoma, seal in with barrier film; and repeat x1  Other:      Pouch/Wafer Application     - Change your pouch every 2  days or when leaking.  yes - Appliance: 1 piece   Use Coloplast 30099  yes - Product Numbers:  yes - Other: Accessories: rings, barrier, powder, and film  Other:____________________________________     Application of Pouch  yes - Gather supplies needed to change pouch and wafer.  yes - Assemble new pouch system before removing old one.  yes - Using the measuring guide or pattern, trace size of opening onto back new pouch system.  yes - Cut out opening.  yes - Remove the control backing  yes - Set aside assembled pouch  yes - Remove worn appliance by gently pulling away from skin. Use ADHESIVE REMOVER/ No Sting  yes - Look at back of the pouch before throwing away to see how it is worn.   yes - Wash skin with warm water or _____________, rinse and pat dry.    yes - Inspect  skin for redness or irritation. Barrier Film around stoma. Apply Silver Alginate over wound and Exuderm over silver Alginate and wounds,     Okay to use Afrin for Bleeding as

## 2025-03-11 ENCOUNTER — HOSPITAL ENCOUNTER (OUTPATIENT)
Dept: WOUND CARE | Age: 89
Discharge: HOME | End: 2025-03-11
Payer: MEDICARE

## 2025-03-11 VITALS — DIASTOLIC BLOOD PRESSURE: 81 MMHG | TEMPERATURE: 97 F | SYSTOLIC BLOOD PRESSURE: 164 MMHG | HEART RATE: 72 BPM

## 2025-03-11 PROCEDURE — 11106 INCAL BX SKN SINGLE LES: CPT

## 2025-03-11 PROCEDURE — 99214 OFFICE O/P EST MOD 30 MIN: CPT | Performed by: SURGERY

## 2025-03-11 PROCEDURE — 88305 TISSUE EXAM BY PATHOLOGIST: CPT

## 2025-03-11 PROCEDURE — 99213 OFFICE O/P EST LOW 20 MIN: CPT

## 2025-03-11 PROCEDURE — 11042 DBRDMT SUBQ TIS 1ST 20SQCM/<: CPT

## 2025-03-11 NOTE — PATIENT INSTRUCTIONS
[facility]  [address]   [place], [place] [address]  Telephone: [phone]     FAX [phone]           Name: Jojo Alejandre  : 1935   AGE: 87 y.o.  MRN: [id number]  Today's Date: 2023     Ostomy skin care  Cleanse skin prior to applying a new pouch/wafer with:  yes - Water         yes - Cleanse skin with Mild Soap & Water  yes - May Shower    Other: Size- 1&3/8     Topical Treatments to skin around stoma:  Do not apply lotions, creams, or ointments to wound bed unless directed.   yes- Apply barrier film/spray to skin around stoma and let it dry  no - Apply stoma powder to irritated skin around stoma, seal in with barrier film/spray and repeat x2  no - Apply antifungal cream to irritated skin surrounding stoma and work into skin until no longer able to feel cream.  no - Apply antifungal powder to irritated skin surrounding stoma, seal in with barrier film; and repeat x1  Other:      Pouch/Wafer Application     - Change your pouch every 2  days or when leaking.  yes - Appliance: 1 piece   Use Coloplast [id number]  yes - Product Numbers:  yes - Other: Accessories: rings, barrier, powder, and film  Other:____________________________________     Application of Pouch  yes - Gather supplies needed to change pouch and wafer.  yes - Assemble new pouch system before removing old one.  yes - Using the measuring guide or pattern, trace size of opening onto back new pouch system.  yes - Cut out opening.  yes - Remove the control backing  yes - Set aside assembled pouch  yes - Remove worn appliance by gently pulling away from skin. Use ADHESIVE REMOVER/ No Sting  yes - Look at back of the pouch before throwing away to see how it is worn.   yes - Wash skin with warm water or _____________, rinse and pat dry.    yes - Inspect  skin for redness or irritation. Barrier Film around stoma. Apply Silver Alginate over wound and Exuderm over silver Alginate and wounds,     Okay to use Afrin for Bleeding as

## 2025-03-11 NOTE — PROGRESS NOTES
Jojo Alejandre  AGE: 89 y.o.   GENDER: female  : 1935  TODAY'S DATE:  3/11/2025    No chief complaint on file.       HISTORY of PRESENT ILLNESS HPI     Jojo Alejandre is a 89 y.o. female who presents today for wound evaluation.   History of Wound: Abdominal ulcer  Wound Pain:  mild  Severity:  2 / 10   Wound Type:  pressure  Modifying Factors:  none  Associated Signs/Symptoms:  none    Procedure Note    Performed by: Edmundo Voss MD    Consent obtained: Yes    Time out taken:  Yes    Pain Control:       Debridement:Excisional Debridement    Using curette the wound was sharply debrided    down through and including the removal of subcutaneous tissue.        Devitalized Tissue Debrided:  necrotic/eschar    Pre Debridement Measurements:  Are located in the Wound Documentation Flow Sheet    Wound #: 1     Post  Debridement Measurements:  Wound 25 Abdomen Left #1 (Active)   Wound Etiology Other 25 1042   Wound Length (cm) 4 cm 25 1042   Wound Width (cm) 2 cm 25 1042   Wound Depth (cm) 0.1 cm 25 1042   Wound Surface Area (cm^2) 8 cm^2 25 1042   Wound Volume (cm^3) 0.8 cm^3 25 1042   Post-Procedure Length (cm) 4 cm 25 1110   Post-Procedure Width (cm) 2 cm 25 1110   Post-Procedure Depth (cm) 0.1 cm 25 1110   Post-Procedure Surface Area (cm^2) 8 cm^2 25 1110   Post-Procedure Volume (cm^3) 0.8 cm^3 25 1110   Wound Assessment Bleeding 25 1110   Drainage Amount Moderate (25-50%) 25 1110   Number of days: 0           Total Surface Area Debrided:  8 sq cm     Bleeding:  Minimal    Hemostasis Achieved:  by pressure    Procedural Pain:  2  / 10     Post Procedural Pain:  0 / 10     Response to treatment:  Well tolerated by patient.     The nature of the patient's condition was explained in depth. The patient was informed that their compliance to the treatment plan is paramount to successful healing and prevention of further ulceration

## 2025-03-11 NOTE — PLAN OF CARE
Discharge instructions given.  Patient verbalized understanding.  Return to [facility] in 1 week(s) to see Duane Post NP.  Biopsy done

## 2025-03-13 ENCOUNTER — RESULTS FOLLOW-UP (OUTPATIENT)
Dept: WOUND CARE | Age: 89
End: 2025-03-13

## 2025-03-20 ENCOUNTER — HOSPITAL ENCOUNTER (OUTPATIENT)
Dept: WOUND CARE | Age: 89
Discharge: HOME | End: 2025-03-20
Payer: MEDICARE

## 2025-03-20 DIAGNOSIS — K94.00 COLOSTOMY COMPLICATION (HCC): ICD-10-CM

## 2025-03-20 DIAGNOSIS — L30.9 PERISTOMAL DERMATITIS: Primary | ICD-10-CM

## 2025-03-20 DIAGNOSIS — K46.9 PERISTOMAL HERNIA: ICD-10-CM

## 2025-03-20 DIAGNOSIS — L89.892 PRESSURE ULCER OF OTHER SITE, STAGE 2 (HCC): ICD-10-CM

## 2025-03-20 PROCEDURE — 99214 OFFICE O/P EST MOD 30 MIN: CPT

## 2025-03-20 PROCEDURE — 99213 OFFICE O/P EST LOW 20 MIN: CPT

## 2025-03-20 NOTE — PROGRESS NOTES
Diatrizoate Meglumine [Diatrizoate]      Patient allergic to ivp dye (itching)    Erythromycin     Moxifloxacin     Propoxyphene     Amoxicillin Itching    Doxycycline Itching     Other reaction(s): Other (See Comments)  Blurred vision    Ipratropium Other (See Comments)    Ciprofibrate Nausea Only    Fluticasone Other (See Comments)    Iodinated Contrast Media     Sulfa Antibiotics        MEDICATIONS    Current Outpatient Medications on File Prior to Encounter   Medication Sig Dispense Refill    Saccharomyces boulardii (PROBIOTIC) 250 MG CAPS Take by mouth in the morning and at bedtime      pantoprazole (PROTONIX) 40 MG tablet Take 1 tablet by mouth in the morning and 1 tablet in the evening. 60 tablet 4    traMADol (ULTRAM) 50 MG tablet Take 1 tablet by mouth every 6 hours as needed for Pain.      ondansetron (ZOFRAN) 4 MG tablet Take 1 tablet by mouth every 12 hours as needed for Nausea or Vomiting 30 tablet 1    aspirin 81 MG EC tablet Take 1 tablet by mouth daily (Patient not taking: Reported on 2/6/2025)      diclofenac sodium (VOLTAREN) 1 % GEL  (Patient not taking: Reported on 2/6/2025)      difluprednate (DUREZOL) 0.05 % EMUL  (Patient not taking: Reported on 2/6/2025)      potassium bicarb-citric acid (EFFER-K) 10 MEQ TBEF effervescent tablet Take 1 tablet twice a day by oral route. (Patient not taking: Reported on 2/6/2025)      potassium bicarbonate (EFFER-K/K-LYTE) 25 MEQ disintegrating tablet DISSOLVE 1 TABLET IN 3-4 OUNCES OF COLD WATER AND TAKE TWICE DAILY      potassium chloride (KLOR-CON) 10 MEQ extended release tablet Take 1 tablet by mouth daily (Patient not taking: Reported on 2/6/2025)      triamcinolone (KENALOG) 0.1 % cream Apply topically 2 times daily (Patient not taking: Reported on 2/6/2025)      ALPRAZolam (XANAX) 0.5 MG tablet 3 times daily as needed for Anxiety.      amitriptyline (ELAVIL) 50 MG tablet amitriptyline 50 mg tablet   Take 1 tablet every day by oral route.      atenolol

## 2025-03-27 ENCOUNTER — HOSPITAL ENCOUNTER (OUTPATIENT)
Dept: WOUND CARE | Age: 89
Discharge: HOME | End: 2025-03-27
Payer: MEDICARE

## 2025-03-27 VITALS — RESPIRATION RATE: 16 BRPM | SYSTOLIC BLOOD PRESSURE: 161 MMHG | HEART RATE: 75 BPM | DIASTOLIC BLOOD PRESSURE: 83 MMHG

## 2025-03-27 DIAGNOSIS — L89.892 PRESSURE ULCER OF OTHER SITE, STAGE 2 (HCC): Primary | ICD-10-CM

## 2025-03-27 DIAGNOSIS — K94.00 COLOSTOMY COMPLICATION (HCC): ICD-10-CM

## 2025-03-27 DIAGNOSIS — K46.9 PERISTOMAL HERNIA: ICD-10-CM

## 2025-03-27 DIAGNOSIS — L30.9 PERISTOMAL DERMATITIS: ICD-10-CM

## 2025-03-27 PROCEDURE — 99213 OFFICE O/P EST LOW 20 MIN: CPT

## 2025-03-27 PROCEDURE — 99212 OFFICE O/P EST SF 10 MIN: CPT

## 2025-03-27 ASSESSMENT — PAIN DESCRIPTION - LOCATION: LOCATION: ABDOMEN

## 2025-03-27 ASSESSMENT — PAIN SCALES - GENERAL: PAINLEVEL_OUTOF10: 6

## 2025-03-27 NOTE — PROGRESS NOTES
[facility]  [address] [place], [place] [address]  Telephone: [phone]      NAME:  Jojo Alejandre  MEDICAL RECORD NUMBER:  [id number]  AGE: 89 y.o.   GENDER:  female  :  1935  TODAY'S DATE:  3/27/2025    Subjective       Chief Complaint   Patient presents with    Wound Check     Follow up ostomy -          HISTORY of PRESENT ILLNESS HPI     Jojo Alejandre is a 89 y.o. female Established colostomy patient referred by patient, who presents today for ostomy/stoma evaluation.   Pouching/Skin Issues: Peristomal bleeding  Current Pouching System: Coloplast    History of Ostomy: Patient states she seen Jannie Ballesteros ostomy nurse practitioner in 2024 for peristomal bleeding.  Patient states she gets 2 to 3 days wear time and noticed bleeding upon removing old pouch. Patient states she has had bleeding before in the past and silver nitrate was used to the area to control bleeding.  She states she has had her ostomy for 9 years and is having no other issues./Surgeon: Unknown    Wound/Ulcer Pain Timing/Severity: none  Quality of pain: N/A  Severity:  0 / 10   Modifying Factors: None  Associated Signs/Symptoms: none    3/27/2025: No issues or concerns reported.  Peristomal wound measurement smaller this week.  Continue current treatment.  Follow-up in 2 weeks.    3/20/2025: Skin biopsy negative for malignancy.  Discussed at length with patient etiology of stage II pressure ulcer underneath ostomy appliance.  Patient apprehensive about changing ostomy appliance to soft conformable appliance from firm appliance.  New treatment to wound started with alginate with silver, foam dressing, and thin DuoDERM.  Follow-up in 1 week.    2025: Patient concerned about reopening of peristomal wound as at last visit this area was closed.  No issues with leaking but patient describes mild discomfort at open area left of stoma.  Silver nitrate rolled onto bleeding areas for

## 2025-03-27 NOTE — PROGRESS NOTES
[facility] Fax # [phone]        Patient Instructions   [facility]  [address]   [place], [place] [address]  Telephone: [phone]     FAX [phone]           Name: Jojo Alejandre  : 1935   AGE: 87 y.o.  MRN: [id number]  Today's Date: 2023     Ostomy skin care  Cleanse skin prior to applying a new pouch/wafer with:  yes - Water         yes - Cleanse skin with Mild Soap & Water  yes - May Shower    Other: Size- 1&3/8   Wound 4 x 2.1 x 0.1    Topical Treatments to skin around stoma:  Do not apply lotions, creams, or ointments to wound bed unless directed.   yes- Apply barrier film/spray to skin around stoma and let it dry  no - Apply stoma powder to irritated skin around stoma, seal in with barrier film/spray and repeat x2  no - Apply antifungal cream to irritated skin surrounding stoma and work into skin until no longer able to feel cream.  no - Apply antifungal powder to irritated skin surrounding stoma, seal in with barrier film; and repeat x1  Other:      Pouch/Wafer Application     - Change your pouch every 2  days or when leaking.  yes - Appliance: 1 piece   Use [employer]ott[employer]64  yes - Product Numbers:  yes - Other: Accessories: rings, barrier, powder, and film  Other:____________________________________     Application of Pouch  yes - Gather supplies needed to change pouch and wafer.  yes - Assemble new pouch system before removing old one.  yes - Using the measuring guide or pattern, trace size of opening onto back new pouch system.  yes - Cut out opening.  yes - Remove the control backing  yes - Set aside assembled pouch  yes - Remove worn appliance by gently pulling away from skin. Use ADHESIVE REMOVER/ No Sting  yes - Look at back of the pouch before throwing away to see how it is worn.   yes - Wash skin with warm water or _____________, rinse and pat dry.    yes - Inspect  skin for redness or irritation. Barrier Film around ostomy where tape goes.  Apply

## 2025-03-27 NOTE — PATIENT INSTRUCTIONS
[facility]  [address]   [place], [place] [address]  Telephone: [phone]     FAX [phone]           Name: Jojo Alejandre  : 1935   AGE: 87 y.o.  MRN: [id number]  Today's Date: 2023     Ostomy skin care  Cleanse skin prior to applying a new pouch/wafer with:  yes - Water         yes - Cleanse skin with Mild Soap & Water  yes - May Shower    Other: Size- 1&3/8   Wound 4 x 2.1 x 0.1    Topical Treatments to skin around stoma:  Do not apply lotions, creams, or ointments to wound bed unless directed.   yes- Apply barrier film/spray to skin around stoma and let it dry  no - Apply stoma powder to irritated skin around stoma, seal in with barrier film/spray and repeat x2  no - Apply antifungal cream to irritated skin surrounding stoma and work into skin until no longer able to feel cream.  no - Apply antifungal powder to irritated skin surrounding stoma, seal in with barrier film; and repeat x1  Other:      Pouch/Wafer Application     - Change your pouch every 2  days or when leaking.  yes - Appliance: 1 piece   Use Coloplast [id number]  yes - Product Numbers:  yes - Other: Accessories: rings, barrier, powder, and film  Other:____________________________________     Application of Pouch  yes - Gather supplies needed to change pouch and wafer.  yes - Assemble new pouch system before removing old one.  yes - Using the measuring guide or pattern, trace size of opening onto back new pouch system.  yes - Cut out opening.  yes - Remove the control backing  yes - Set aside assembled pouch  yes - Remove worn appliance by gently pulling away from skin. Use ADHESIVE REMOVER/ No Sting  yes - Look at back of the pouch before throwing away to see how it is worn.   yes - Wash skin with warm water or _____________, rinse and pat dry.    yes - Inspect  skin for redness or irritation. Barrier Film around ostomy where tape goes.  Apply Exufiber Ag and cover with Qwick and thin Duoderm over wound    Okay

## 2025-04-07 NOTE — PATIENT INSTRUCTIONS
[facility]  [address]   [place], [place] [address]  Telephone: [phone]     FAX [phone]           Name: Jojo Alejandre  : 1935   AGE: 87 y.o.  MRN: [id number]  Today's Date: 2023     Ostomy skin care  Cleanse skin prior to applying a new pouch/wafer with:  yes - Water         yes - Cleanse skin with Mild Soap & Water  yes - May Shower    Other: Size- 1&3/8   Wound 4 x 2.1 x 0.1    Topical Treatments to skin around stoma:  Do not apply lotions, creams, or ointments to wound bed unless directed.   yes- Apply barrier film/spray to skin around stoma and let it dry  no - Apply stoma powder to irritated skin around stoma, seal in with barrier film/spray and repeat x2  no - Apply antifungal cream to irritated skin surrounding stoma and work into skin until no longer able to feel cream.  no - Apply antifungal powder to irritated skin surrounding stoma, seal in with barrier film; and repeat x1  Other:      Pouch/Wafer Application     - Change your pouch every 2  days or when leaking.  yes - Appliance: 1 piece   Use Coloplast 27797  yes - Product Numbers:  yes - Other: Accessories: rings, barrier, powder, and film  Other:____________________________________     Application of Pouch  yes - Gather supplies needed to change pouch and wafer.  yes - Assemble new pouch system before removing old one.  yes - Using the measuring guide or pattern, trace size of opening onto back new pouch system.  yes - Cut out opening.  yes - Remove the control backing  yes - Set aside assembled pouch  yes - Remove worn appliance by gently pulling away from skin. Use ADHESIVE REMOVER/ No Sting  yes - Look at back of the pouch before throwing away to see how it is worn.   yes - Wash skin with warm water or _____________, rinse and pat dry.    yes - Inspect  skin for redness or irritation. Barrier Film around ostomy where tape goes.  Apply Exufiber Ag and cover with Qwick and thin Duoderm over wound    Okay

## 2025-04-10 ENCOUNTER — HOSPITAL ENCOUNTER (OUTPATIENT)
Dept: WOUND CARE | Age: 89
Discharge: HOME | End: 2025-04-10

## 2025-04-11 ENCOUNTER — TRANSCRIBE ORDERS (OUTPATIENT)
Dept: ADMINISTRATIVE | Age: 89
End: 2025-04-11

## 2025-04-11 DIAGNOSIS — R10.9 ACUTE ABDOMINAL PAIN: Primary | ICD-10-CM

## 2025-04-15 NOTE — PATIENT INSTRUCTIONS
[facility]  [address]   [place], [place] [address]  Telephone: [phone]     FAX [phone]           Name: Jojo Alejandre  : 1935   AGE: 87 y.o.  MRN: [id number]  Today's Date: 2023     Ostomy skin care  Cleanse skin prior to applying a new pouch/wafer with:  yes - Water         yes - Cleanse skin with Mild Soap & Water  yes - May Shower    Other: Size- 1&3/8   Wound 4 x 2.1 x 0.1    Topical Treatments to skin around stoma:  Do not apply lotions, creams, or ointments to wound bed unless directed.   yes- Apply barrier film/spray to skin around stoma and let it dry  no - Apply stoma powder to irritated skin around stoma, seal in with barrier film/spray and repeat x2  no - Apply antifungal cream to irritated skin surrounding stoma and work into skin until no longer able to feel cream.  no - Apply antifungal powder to irritated skin surrounding stoma, seal in with barrier film; and repeat x1  Other:      Pouch/Wafer Application     - Change your pouch every 2  days or when leaking.  yes - Appliance: 1 piece   Use Coloplast 15240  yes - Product Numbers:  yes - Other: Accessories: rings, barrier, powder, and film  Other:____________________________________     Application of Pouch  yes - Gather supplies needed to change pouch and wafer.  yes - Assemble new pouch system before removing old one.  yes - Using the measuring guide or pattern, trace size of opening onto back new pouch system.  yes - Cut out opening.  yes - Remove the control backing  yes - Set aside assembled pouch  yes - Remove worn appliance by gently pulling away from skin. Use ADHESIVE REMOVER/ No Sting  yes - Look at back of the pouch before throwing away to see how it is worn.   yes - Wash skin with warm water or _____________, rinse and pat dry.    yes - Inspect  skin for redness or irritation. Barrier Film around ostomy where tape goes.  Apply Exufiber Ag and cover with Qwick and thin Duoderm over wound    Okay

## 2025-04-17 ENCOUNTER — HOSPITAL ENCOUNTER (OUTPATIENT)
Dept: WOUND CARE | Age: 89
Discharge: HOME | End: 2025-04-17
Payer: MEDICARE

## 2025-04-17 VITALS
DIASTOLIC BLOOD PRESSURE: 84 MMHG | WEIGHT: 125.6 LBS | SYSTOLIC BLOOD PRESSURE: 156 MMHG | HEART RATE: 78 BPM | RESPIRATION RATE: 15 BRPM | BODY MASS INDEX: 22.25 KG/M2

## 2025-04-17 DIAGNOSIS — L89.892 PRESSURE ULCER OF OTHER SITE, STAGE 2 (HCC): ICD-10-CM

## 2025-04-17 DIAGNOSIS — K46.9 PERISTOMAL HERNIA: ICD-10-CM

## 2025-04-17 DIAGNOSIS — K94.00 COLOSTOMY COMPLICATION (HCC): ICD-10-CM

## 2025-04-17 DIAGNOSIS — L30.9 PERISTOMAL DERMATITIS: Primary | ICD-10-CM

## 2025-04-17 PROCEDURE — 99213 OFFICE O/P EST LOW 20 MIN: CPT

## 2025-04-17 PROCEDURE — 99212 OFFICE O/P EST SF 10 MIN: CPT

## 2025-04-17 NOTE — PLAN OF CARE
Discharge instructions given.  Patient verbalized understanding.  Return to [facility] in 1 month.  Continue Ostomy Care

## 2025-04-17 NOTE — PROGRESS NOTES
[facility] Fax # [phone]        Patient Instructions   [facility]  [address]   [place], [place] [address]  Telephone: [phone]     FAX [phone]           Name: Jojo Alejandre  : 1935   AGE: 87 y.o.  MRN: [id number]  Today's Date: 2023     Ostomy skin care  Cleanse skin prior to applying a new pouch/wafer with:  yes - Water         yes - Cleanse skin with Mild Soap & Water  yes - May Shower    Other: Size- 1&3/8   Wound 4 x 2.1 x 0.1    Topical Treatments to skin around stoma:  Do not apply lotions, creams, or ointments to wound bed unless directed.   yes- Apply barrier film/spray to skin around stoma and let it dry  no - Apply stoma powder to irritated skin around stoma, seal in with barrier film/spray and repeat x2  no - Apply antifungal cream to irritated skin surrounding stoma and work into skin until no longer able to feel cream.  no - Apply antifungal powder to irritated skin surrounding stoma, seal in with barrier film; and repeat x1  Other:      Pouch/Wafer Application     - Change your pouch every 2  days or when leaking.  yes - Appliance: 1 piece   Use Col[employer] group64  yes - Product Numbers:  yes - Other: Accessories: rings, barrier, powder, and film  Other:____________________________________     Application of Pouch  yes - Gather supplies needed to change pouch and wafer.  yes - Assemble new pouch system before removing old one.  yes - Using the measuring guide or pattern, trace size of opening onto back new pouch system.  yes - Cut out opening.  yes - Remove the control backing  yes - Set aside assembled pouch  yes - Remove worn appliance by gently pulling away from skin. Use ADHESIVE REMOVER/ No Sting  yes - Look at back of the pouch before throwing away to see how it is worn.   yes - Wash skin with warm water or _____________, rinse and pat dry.    yes - Inspect  skin for redness or irritation. Barrier Film around ostomy where tape goes.  Apply

## 2025-04-17 NOTE — PROGRESS NOTES
[facility]  [address] [place], [place] [address]  Telephone: [phone]      NAME:  Jojo Alejandre  MEDICAL RECORD NUMBER:  [id number]  AGE: 89 y.o.   GENDER:  female  :  1935  TODAY'S DATE:  2025    Subjective       Chief Complaint   Patient presents with    Wound Check     Ostomy follow up         HISTORY of PRESENT ILLNESS HPI     Jojo Alejandre is a 89 y.o. female Established colostomy patient referred by patient, who presents today for ostomy/stoma evaluation.   Pouching/Skin Issues: Peristomal bleeding  Current Pouching System: Coloplast    History of Ostomy: Patient states she seen Jannie Ballesteros ostomy nurse practitioner in 2024 for peristomal bleeding.  Patient states she gets 2 to 3 days wear time and noticed bleeding upon removing old pouch. Patient states she has had bleeding before in the past and silver nitrate was used to the area to control bleeding.  She states she has had her ostomy for 9 years and is having no other issues./Surgeon: Unknown    Wound/Ulcer Pain Timing/Severity: none  Quality of pain: N/A  Severity:  0 / 10   Modifying Factors: None  Associated Signs/Symptoms: none    2025: No issues or concerns reported.  Wound appearance improved.  Continue current treatment.  Follow-up in 4 weeks.    3/27/2025: No issues or concerns reported.  Peristomal wound measurement smaller this week.  Continue current treatment.  Follow-up in 2 weeks.    3/20/2025: Skin biopsy negative for malignancy.  Discussed at length with patient etiology of stage II pressure ulcer underneath ostomy appliance.  Patient apprehensive about changing ostomy appliance to soft conformable appliance from firm appliance.  New treatment to wound started with alginate with silver, foam dressing, and thin DuoDERM.  Follow-up in 1 week.    2025: Patient concerned about reopening of peristomal wound as at last visit this area was closed.  No issues with

## 2025-04-18 ENCOUNTER — HOSPITAL ENCOUNTER (OUTPATIENT)
Age: 89
Discharge: HOME | End: 2025-04-18
Attending: INTERNAL MEDICINE
Payer: MEDICARE

## 2025-04-18 ENCOUNTER — HOSPITAL ENCOUNTER (OUTPATIENT)
Dept: CT IMAGING | Age: 89
Discharge: HOME | End: 2025-04-18
Attending: INTERNAL MEDICINE
Payer: MEDICARE

## 2025-04-18 DIAGNOSIS — R10.9 ACUTE ABDOMINAL PAIN: ICD-10-CM

## 2025-04-18 LAB
ALBUMIN SERPL-MCNC: 4.1 G/DL (ref 3.4–5)
ALBUMIN/GLOB SERPL: 1.5 {RATIO} (ref 1.1–2.2)
ALP SERPL-CCNC: 71 U/L (ref 40–129)
ALT SERPL-CCNC: 8 U/L (ref 10–40)
ANION GAP SERPL CALCULATED.3IONS-SCNC: 10 MMOL/L (ref 3–16)
AST SERPL-CCNC: 19 U/L (ref 15–37)
BILIRUB SERPL-MCNC: 0.3 MG/DL (ref 0–1)
BUN SERPL-MCNC: 29 MG/DL (ref 7–20)
CALCIUM SERPL-MCNC: 9.6 MG/DL (ref 8.3–10.6)
CHLORIDE SERPL-SCNC: 92 MMOL/L (ref 99–110)
CO2 SERPL-SCNC: 29 MMOL/L (ref 21–32)
CREAT SERPL-MCNC: 1.2 MG/DL (ref 0.6–1.2)
GFR SERPLBLD CREATININE-BSD FMLA CKD-EPI: 44 ML/MIN/{1.73_M2}
GLUCOSE SERPL-MCNC: 99 MG/DL (ref 70–99)
LIPASE SERPL-CCNC: 55 U/L (ref 13–60)
POTASSIUM SERPL-SCNC: 4.4 MMOL/L (ref 3.5–5.1)
PROT SERPL-MCNC: 6.9 G/DL (ref 6.4–8.2)
SODIUM SERPL-SCNC: 131 MMOL/L (ref 136–145)

## 2025-04-18 PROCEDURE — 36415 COLL VENOUS BLD VENIPUNCTURE: CPT

## 2025-04-18 PROCEDURE — 74176 CT ABD & PELVIS W/O CONTRAST: CPT

## 2025-04-18 PROCEDURE — 83690 ASSAY OF LIPASE: CPT

## 2025-04-18 PROCEDURE — 80053 COMPREHEN METABOLIC PANEL: CPT

## 2025-05-22 ENCOUNTER — HOSPITAL ENCOUNTER (OUTPATIENT)
Dept: WOUND CARE | Age: 89
Discharge: HOME | End: 2025-05-22
Payer: MEDICARE

## 2025-05-22 ENCOUNTER — OFFICE VISIT (OUTPATIENT)
Dept: SURGERY | Age: 89
End: 2025-05-22
Payer: MEDICARE

## 2025-05-22 VITALS
WEIGHT: 121.8 LBS | BODY MASS INDEX: 21.58 KG/M2 | SYSTOLIC BLOOD PRESSURE: 140 MMHG | HEIGHT: 63 IN | DIASTOLIC BLOOD PRESSURE: 88 MMHG

## 2025-05-22 VITALS
BODY MASS INDEX: 21.58 KG/M2 | WEIGHT: 121.8 LBS | SYSTOLIC BLOOD PRESSURE: 149 MMHG | RESPIRATION RATE: 15 BRPM | HEART RATE: 66 BPM | DIASTOLIC BLOOD PRESSURE: 74 MMHG | HEIGHT: 63 IN

## 2025-05-22 DIAGNOSIS — K43.5 PARASTOMAL HERNIA WITHOUT OBSTRUCTION OR GANGRENE: Primary | ICD-10-CM

## 2025-05-22 DIAGNOSIS — K94.00 COLOSTOMY COMPLICATION (HCC): Primary | ICD-10-CM

## 2025-05-22 DIAGNOSIS — L30.9 PERISTOMAL DERMATITIS: ICD-10-CM

## 2025-05-22 DIAGNOSIS — L89.892 PRESSURE ULCER OF OTHER SITE, STAGE 2 (HCC): ICD-10-CM

## 2025-05-22 DIAGNOSIS — K46.9 PERISTOMAL HERNIA: ICD-10-CM

## 2025-05-22 DIAGNOSIS — Z93.3 COLOSTOMY STATUS (HCC): ICD-10-CM

## 2025-05-22 PROCEDURE — 99213 OFFICE O/P EST LOW 20 MIN: CPT

## 2025-05-22 PROCEDURE — 1125F AMNT PAIN NOTED PAIN PRSNT: CPT | Performed by: SURGERY

## 2025-05-22 PROCEDURE — 99214 OFFICE O/P EST MOD 30 MIN: CPT | Performed by: SURGERY

## 2025-05-22 PROCEDURE — 4004F PT TOBACCO SCREEN RCVD TLK: CPT | Performed by: SURGERY

## 2025-05-22 PROCEDURE — 1090F PRES/ABSN URINE INCON ASSESS: CPT | Performed by: SURGERY

## 2025-05-22 PROCEDURE — G8427 DOCREV CUR MEDS BY ELIG CLIN: HCPCS | Performed by: SURGERY

## 2025-05-22 PROCEDURE — G8420 CALC BMI NORM PARAMETERS: HCPCS | Performed by: SURGERY

## 2025-05-22 PROCEDURE — 1123F ACP DISCUSS/DSCN MKR DOCD: CPT | Performed by: SURGERY

## 2025-05-22 NOTE — PLAN OF CARE
[facility] Fax # [phone]        Patient Instructions   [facility]  [address]   [place], [place] [address]  Telephone: [phone]     FAX [phone]           Name: Jojo Alejandre  : 1935   AGE: 87 y.o.  MRN: [id number]  Today's Date: 2023     Ostomy skin care  Cleanse skin prior to applying a new pouch/wafer with:  yes - Water         yes - Cleanse skin with Mild Soap & Water  yes - May Shower    Other: Size- 1&3/8     Wound 3.1 x 1.8 X 0.1   Wound dressing: apply collgen, silver alginate, Qwick pad, and thin duoderm change with every pouch change.       Topical Treatments to skin around stoma:  Do not apply lotions, creams, or ointments to wound bed unless directed.   yes- Apply barrier film/spray to skin around stoma and let it dry  no - Apply stoma powder to irritated skin around stoma, seal in with barrier film/spray and repeat x2  no - Apply antifungal cream to irritated skin surrounding stoma and work into skin until no longer able to feel cream.  no - Apply antifungal powder to irritated skin surrounding stoma, seal in with barrier film; and repeat x1  Other:      Pouch/Wafer Application     - Change your pouch every 2  days or when leaking.  yes - Appliance: 1 piece   Use Coloplast 56013  yes - Product Numbers:  yes - Other: Accessories: rings, barrier, powder, and film  Other:____________________________________     Application of Pouch  yes - Gather supplies needed to change pouch and wafer.  yes - Assemble new pouch system before removing old one.  yes - Using the measuring guide or pattern, trace size of opening onto back new pouch system.  yes - Cut out opening.  yes - Remove the control backing  yes - Set aside assembled pouch  yes - Remove worn appliance by gently pulling away from skin. Use ADHESIVE REMOVER/ No Sting  yes - Look at back of the pouch before throwing away to see how it is worn.   yes - Wash skin with warm water or _____________, rinse

## 2025-05-22 NOTE — PATIENT INSTRUCTIONS
Barrier Film around ostomy where tape goes.  Apply Exufiber Ag and cover with Qwick and thin Duoderm over wound    Okay to use Afrin for Bleeding as neded  yes - Apply  barrier seals or rings around stoma or back of wafer.  yes - Apply wafer/pouch system over stoma and press down firmly starting around stoma and working outward.  yes - Remove control backing paper tape border if applicable and press to skin.      yes - Attach new pouch to wafer.  yes - Secure bottom of pouch.  yes - Apply barrier extenders to outside edges of pouch.   yes - Lay/Sit quietly for 15-20 min to allow adhesives to mold to skin.  Other: _____________________________________     Emptying Pouch; Colostomy or Urostomy  Colostomy:  yes - Empty  pouch when 1/3 full of gas, stool.  Clean bottom edge with damp toilet paper or bathroom wipe and close pouch.  no - For non-drainable pouch - remove when 1/2 full and throw away.  Clean wafer flange (ring) with toilet tissue or damp paper towel before reapplying new pouch  Urostomy:  yes - Empty  pouch when 1/3 full of urine     N/A - Urostomy:  Attach bottom connector of urostomy pouch to a nighttime drainage system and switch spigot to the open position.  N/A - Other: Urostomy: Care of nighttime drainage  ________________________________________________________________________     Other:  yes - Shower or Swim Care Pat pouch dry, Use hair dryer on cool setting to dry back of pouch and border, and Reapply barrier extenders or tape.  N/A - Odor Control with deodorizer into bottom of pouch after each emptying  N/A - Lubricating gel to pouch to help emptying of thick stool  Other: See additional instructions ____Home care changing 3 times weekly until wound heals___________________________________   Okay to use Afrin for bleeding as needed      Contact Ostomy Care Nurse Practitioner  leaking issues and skin irritation   Call [facility] and tell them Duane will sign for supplies as her doctor is on

## 2025-05-22 NOTE — PROGRESS NOTES
:     Jojo Alejandre is a 89 y.o. female     Referring physician:     CC: Parastomal hernia    HPI: Very pleasant 89-year-old female who presents for evaluation of left lower quadrant abdominal pain.  She has a history of a Jordan's procedure in 2015 for diverticular disease.  The pain in the left lower quadrant of the abdomen is crampy and severe at times.  Nothing makes it better or worse.  Associated symptoms include nausea as well as loose stools.      Past Medical History:   Diagnosis Date    Anxiety     Arthritis     Colostomy care (HCC)     Diverticulitis     Hypertension        Amoxicillin-pot clavulanate, Diatrizoate sodium and diatrizoate meglumine [diatrizoate], Erythromycin, Moxifloxacin, Propoxyphene, Amoxicillin, Doxycycline, Ipratropium, Ciprofibrate, Fluticasone, Iodinated contrast media, and Sulfa antibiotics     Past Surgical History:   Procedure Laterality Date    ABDOMEN SURGERY      APPENDECTOMY      COLOSTOMY      HYSTERECTOMY (CERVIX STATUS UNKNOWN)      UPPER GASTROINTESTINAL ENDOSCOPY N/A 2/6/2025    ESOPHAGOGASTRODUODENOSCOPY BIOPSY performed by Tanner Goldman MD at [facility] ENDOSCOPY        Prior to Visit Medications    Medication Sig Taking? Authorizing Provider   Saccharomyces boulardii (PROBIOTIC) 250 MG CAPS Take by mouth in the morning and at bedtime Yes Miri Roque MD   pantoprazole (PROTONIX) 40 MG tablet Take 1 tablet by mouth in the morning and 1 tablet in the evening. Yes Tanner Goldman MD   traMADol (ULTRAM) 50 MG tablet Take 1 tablet by mouth every 6 hours as needed for Pain. Yes Miri Roque MD   ondansetron (ZOFRAN) 4 MG tablet Take 1 tablet by mouth every 12 hours as needed for Nausea or Vomiting Yes Tanner Goldman MD   aspirin 81 MG EC tablet Take 1 tablet by mouth daily Yes Miri Roque MD   diclofenac sodium (VOLTAREN) 1 % GEL  Yes Miri Roque MD   difluprednate (DUREZOL) 0.05 % EMUL  Yes Miri Roque MD   potassium

## 2025-05-22 NOTE — PROGRESS NOTES
[facility]  [address] [place], [place] [address]  Telephone: [phone]      NAME:  Jojo Alejandre  MEDICAL RECORD NUMBER:  [id number]  AGE: 89 y.o.   GENDER:  female  :  1935  TODAY'S DATE:  2025    Subjective       Chief Complaint   Patient presents with    Wound Check     Colostomy         HISTORY of PRESENT ILLNESS HPI     Jojo Alejandre is a 89 y.o. female Established colostomy patient referred by patient, who presents today for ostomy/stoma evaluation.   Pouching/Skin Issues: Peristomal bleeding  Current Pouching System: Coloplast    History of Ostomy: Patient states she seen Jannie Ballesteros ostomy nurse practitioner in 2024 for peristomal bleeding.  Patient states she gets 2 to 3 days wear time and noticed bleeding upon removing old pouch. Patient states she has had bleeding before in the past and silver nitrate was used to the area to control bleeding.  She states she has had her ostomy for 9 years and is having no other issues./Surgeon: Unknown    Wound/Ulcer Pain Timing/Severity: none  Quality of pain: N/A  Severity:  0 / 10   Modifying Factors: None  Associated Signs/Symptoms: none    2025: No issues or concerns reported.  Wound appearance improved.  Continue current treatment.  Follow-up in 4 weeks.    3/27/2025: No issues or concerns reported.  Peristomal wound measurement smaller this week.  Continue current treatment.  Follow-up in 2 weeks.    3/20/2025: Skin biopsy negative for malignancy.  Discussed at length with patient etiology of stage II pressure ulcer underneath ostomy appliance.  Patient apprehensive about changing ostomy appliance to soft conformable appliance from firm appliance.  New treatment to wound started with alginate with silver, foam dressing, and thin DuoDERM.  Follow-up in 1 week.    2025: Patient concerned about reopening of peristomal wound as at last visit this area was closed.  No issues with leaking but

## 2025-05-27 ENCOUNTER — OFFICE VISIT (OUTPATIENT)
Dept: VASCULAR SURGERY | Age: 89
End: 2025-05-27
Payer: MEDICARE

## 2025-05-27 VITALS
WEIGHT: 122 LBS | DIASTOLIC BLOOD PRESSURE: 66 MMHG | HEIGHT: 63 IN | BODY MASS INDEX: 21.62 KG/M2 | SYSTOLIC BLOOD PRESSURE: 118 MMHG

## 2025-05-27 DIAGNOSIS — I71.43 INFRARENAL ABDOMINAL AORTIC ANEURYSM (AAA) WITHOUT RUPTURE: Primary | ICD-10-CM

## 2025-05-27 PROCEDURE — 1090F PRES/ABSN URINE INCON ASSESS: CPT | Performed by: SURGERY

## 2025-05-27 PROCEDURE — 4004F PT TOBACCO SCREEN RCVD TLK: CPT | Performed by: SURGERY

## 2025-05-27 PROCEDURE — 1159F MED LIST DOCD IN RCRD: CPT | Performed by: SURGERY

## 2025-05-27 PROCEDURE — G8427 DOCREV CUR MEDS BY ELIG CLIN: HCPCS | Performed by: SURGERY

## 2025-05-27 PROCEDURE — 99213 OFFICE O/P EST LOW 20 MIN: CPT | Performed by: SURGERY

## 2025-05-27 PROCEDURE — G8420 CALC BMI NORM PARAMETERS: HCPCS | Performed by: SURGERY

## 2025-05-27 PROCEDURE — 1123F ACP DISCUSS/DSCN MKR DOCD: CPT | Performed by: SURGERY

## 2025-05-27 NOTE — PROGRESS NOTES
[facility]   Vascular Surgery Followup    Referring Provider:  Damion Hines Jr., MD     No chief complaint on file.       History of Present Illness:  89-year-old female referred today by Dr. Voss known to me with history of infrarenal abdominal aortic aneurysm last seen in October 2023 and deemed not an endograft candidate and too high risk for open surgical repair.  No new complaints    Past Medical History:   has a past medical history of Anxiety, Arthritis, Colostomy care (HCC), Diverticulitis, and Hypertension.    Surgical History:   has a past surgical history that includes Abdomen surgery; Appendectomy; Hysterectomy; colostomy; and Upper gastrointestinal endoscopy (N/A, 2/6/2025).     Social History:   reports that she has been smoking cigarettes. She has a 3.5 pack-year smoking history. She has never used smokeless tobacco. She reports that she does not currently use alcohol.     Family History:  family history is not on file.     Home Medications:  Current Outpatient Medications   Medication Sig Dispense Refill    Saccharomyces boulardii (PROBIOTIC) 250 MG CAPS Take by mouth in the morning and at bedtime      pantoprazole (PROTONIX) 40 MG tablet Take 1 tablet by mouth in the morning and 1 tablet in the evening. 60 tablet 4    traMADol (ULTRAM) 50 MG tablet Take 1 tablet by mouth every 6 hours as needed for Pain.      ondansetron (ZOFRAN) 4 MG tablet Take 1 tablet by mouth every 12 hours as needed for Nausea or Vomiting 30 tablet 1    aspirin 81 MG EC tablet Take 1 tablet by mouth daily      diclofenac sodium (VOLTAREN) 1 % GEL       difluprednate (DUREZOL) 0.05 % EMUL       potassium bicarb-citric acid (EFFER-K) 10 MEQ TBEF effervescent tablet       potassium bicarbonate (EFFER-K/K-LYTE) 25 MEQ disintegrating tablet DISSOLVE 1 TABLET IN 3-4 OUNCES OF COLD WATER AND TAKE TWICE DAILY      potassium chloride (KLOR-CON) 10 MEQ extended release tablet Take 1 tablet by mouth daily

## 2025-06-18 NOTE — PATIENT INSTRUCTIONS
[facility]  [address]   [place], [place] [address]  Telephone: [phone]     FAX [phone]           Name: Jojo Alejandre  : 1935   AGE: 87 y.o.  MRN: [id number]  Today's Date: 2023     Ostomy skin care  Cleanse skin prior to applying a new pouch/wafer with:  yes - Water         yes - Cleanse skin with Mild Soap & Water  yes - May Shower    Other: Size- 1&3/8     Wound 2.4cm x 1.9cm X 0.1cm   Wound dressing: apply collgen, silver alginate, Qwick pad, and thin duoderm change with every pouch change.       Topical Treatments to skin around stoma:  Do not apply lotions, creams, or ointments to wound bed unless directed.   yes- Apply barrier film/spray to skin around stoma and let it dry  no - Apply stoma powder to irritated skin around stoma, seal in with barrier film/spray and repeat x2  no - Apply antifungal cream to irritated skin surrounding stoma and work into skin until no longer able to feel cream.  no - Apply antifungal powder to irritated skin surrounding stoma, seal in with barrier film; and repeat x1  Other:      Pouch/Wafer Application     - Change your pouch every 2  days or when leaking.  yes - Appliance: 1 piece   Use Coloplast 15481  yes - Product Numbers:  yes - Other: Accessories: rings, barrier, powder, and film  Other:____________________________________     Application of Pouch  yes - Gather supplies needed to change pouch and wafer.  yes - Assemble new pouch system before removing old one.  yes - Using the measuring guide or pattern, trace size of opening onto back new pouch system.  yes - Cut out opening.  yes - Remove the control backing  yes - Set aside assembled pouch  yes - Remove worn appliance by gently pulling away from skin. Use ADHESIVE REMOVER/ No Sting  yes - Look at back of the pouch before throwing away to see how it is worn.   yes - Wash skin with warm water or _____________, rinse and pat dry.    yes - Inspect  skin for redness or

## 2025-06-19 ENCOUNTER — HOSPITAL ENCOUNTER (OUTPATIENT)
Dept: WOUND CARE | Age: 89
Discharge: HOME | End: 2025-06-19
Payer: MEDICARE

## 2025-06-19 VITALS — HEART RATE: 72 BPM | SYSTOLIC BLOOD PRESSURE: 172 MMHG | DIASTOLIC BLOOD PRESSURE: 81 MMHG | RESPIRATION RATE: 15 BRPM

## 2025-06-19 DIAGNOSIS — K46.9 PERISTOMAL HERNIA: ICD-10-CM

## 2025-06-19 DIAGNOSIS — K94.00 COLOSTOMY COMPLICATION (HCC): ICD-10-CM

## 2025-06-19 DIAGNOSIS — L89.892 PRESSURE ULCER OF OTHER SITE, STAGE 2 (HCC): Primary | ICD-10-CM

## 2025-06-19 DIAGNOSIS — L30.9 PERISTOMAL DERMATITIS: ICD-10-CM

## 2025-06-19 PROCEDURE — 99213 OFFICE O/P EST LOW 20 MIN: CPT

## 2025-06-19 PROCEDURE — 99212 OFFICE O/P EST SF 10 MIN: CPT

## 2025-06-19 NOTE — PROGRESS NOTES
[facility]  [address] [place], [place] [address]  Telephone: [phone]      NAME:  Jojo Alejandre  MEDICAL RECORD NUMBER:  [id number]  AGE: 89 y.o.   GENDER:  female  :  1935  TODAY'S DATE:  2025    Subjective       Chief Complaint   Patient presents with    Wound Check     Ostomy check         HISTORY of PRESENT ILLNESS HPI     Jojo Alejandre is a 89 y.o. female Established colostomy patient referred by patient, who presents today for ostomy/stoma evaluation.   Pouching/Skin Issues: Peristomal bleeding  Current Pouching System: Coloplast    History of Ostomy: Patient states she seen Jannie Ballesteros ostomy nurse practitioner in 2024 for peristomal bleeding.  Patient states she gets 2 to 3 days wear time and noticed bleeding upon removing old pouch. Patient states she has had bleeding before in the past and silver nitrate was used to the area to control bleeding.  She states she has had her ostomy for 9 years and is having no other issues./Surgeon: Unknown    Wound/Ulcer Pain Timing/Severity: none  Quality of pain: N/A  Severity:  0 / 10   Modifying Factors: None  Associated Signs/Symptoms: none    2025: No issues or concerns reported.    2025: No issues or concerns reported.  Wound appearance improved.  Continue current treatment.  Follow-up in 4 weeks.    PAST MEDICAL HISTORY        Diagnosis Date    Anxiety     Arthritis     Colostomy care (HCC)     Diverticulitis     Hypertension        PAST SURGICAL HISTORY    Past Surgical History:   Procedure Laterality Date    ABDOMEN SURGERY      APPENDECTOMY      COLOSTOMY      HYSTERECTOMY (CERVIX STATUS UNKNOWN)      UPPER GASTROINTESTINAL ENDOSCOPY N/A 2025    ESOPHAGOGASTRODUODENOSCOPY BIOPSY performed by Tanner Goldman MD at [facility] ENDOSCOPY       FAMILY HISTORY    History reviewed. No pertinent family history.    SOCIAL HISTORY    Social History     Tobacco Use    Smoking status: Every Day

## 2025-06-19 NOTE — PLAN OF CARE
rinse and pat dry.    yes - Inspect  skin for redness or irritation. Barrier Film around ostomy where tape goes.  Apply Exufiber Ag and cover with Qwick and thin Duoderm over wound    Okay to use Afrin for Bleeding as neded  yes - Apply  barrier seals or rings around stoma or back of wafer.  yes - Apply wafer/pouch system over stoma and press down firmly starting around stoma and working outward.  yes - Remove control backing paper tape border if applicable and press to skin.      yes - Attach new pouch to wafer.  yes - Secure bottom of pouch.  yes - Apply barrier extenders to outside edges of pouch.   yes - Lay/Sit quietly for 15-20 min to allow adhesives to mold to skin.  Other: _____________________________________     Emptying Pouch; Colostomy or Urostomy  Colostomy:  yes - Empty  pouch when 1/3 full of gas, stool.  Clean bottom edge with damp toilet paper or bathroom wipe and close pouch.  no - For non-drainable pouch - remove when 1/2 full and throw away.  Clean wafer flange (ring) with toilet tissue or damp paper towel before reapplying new pouch  Urostomy:  yes - Empty  pouch when 1/3 full of urine     N/A - Urostomy:  Attach bottom connector of urostomy pouch to a nighttime drainage system and switch spigot to the open position.  N/A - Other: Urostomy: Care of nighttime drainage  ________________________________________________________________________     Other:  yes - Shower or Swim Care Pat pouch dry, Use hair dryer on cool setting to dry back of pouch and border, and Reapply barrier extenders or tape.  N/A - Odor Control with deodorizer into bottom of pouch after each emptying  N/A - Lubricating gel to pouch to help emptying of thick stool  Other: See additional instructions ____Home care changing 3 times weekly until wound heals___________________________________   Okay to use Afrin for bleeding as needed      Contact Ostomy Care Nurse Practitioner  leaking issues and skin irritation   Call

## 2025-07-17 ENCOUNTER — HOSPITAL ENCOUNTER (OUTPATIENT)
Dept: WOUND CARE | Age: 89
Discharge: HOME | End: 2025-07-17
Payer: MEDICARE

## 2025-07-17 VITALS — SYSTOLIC BLOOD PRESSURE: 164 MMHG | HEART RATE: 63 BPM | DIASTOLIC BLOOD PRESSURE: 81 MMHG | RESPIRATION RATE: 18 BRPM

## 2025-07-17 DIAGNOSIS — K46.9 PERISTOMAL HERNIA: ICD-10-CM

## 2025-07-17 DIAGNOSIS — K94.00 COLOSTOMY COMPLICATION (HCC): ICD-10-CM

## 2025-07-17 DIAGNOSIS — L89.892 PRESSURE ULCER OF OTHER SITE, STAGE 2 (HCC): Primary | ICD-10-CM

## 2025-07-17 DIAGNOSIS — L30.9 PERISTOMAL DERMATITIS: ICD-10-CM

## 2025-07-17 PROCEDURE — 99214 OFFICE O/P EST MOD 30 MIN: CPT

## 2025-07-17 PROCEDURE — 99212 OFFICE O/P EST SF 10 MIN: CPT

## 2025-07-17 ASSESSMENT — PAIN DESCRIPTION - FREQUENCY: FREQUENCY: CONTINUOUS

## 2025-07-17 ASSESSMENT — PAIN DESCRIPTION - ORIENTATION: ORIENTATION: LEFT;UPPER;RIGHT

## 2025-07-17 ASSESSMENT — PAIN SCALES - GENERAL: PAINLEVEL_OUTOF10: 5

## 2025-07-17 ASSESSMENT — PAIN DESCRIPTION - LOCATION: LOCATION: ABDOMEN

## 2025-07-17 ASSESSMENT — PAIN DESCRIPTION - DESCRIPTORS: DESCRIPTORS: BURNING;TEARING

## 2025-07-17 NOTE — PATIENT INSTRUCTIONS
[facility] [place] [facility]  [address]   [place], [place] [address]  Telephone: [phone]     FAX [phone]           Name: Jojo Alejandre  : 1935   AGE: 87 y.o.  MRN: [id number]  Today's Date: 2023     Ostomy skin care  Cleanse skin prior to applying a new pouch/wafer with:  yes - Water         yes - Cleanse skin with Mild Soap & Water  yes - May Shower    Other: Size- 1&3/8     Wound 3.7cm x 2.1cm X 0.1cm   Wound dressing: apply collagen, silver alginate, Qwick pad, and thin duoderm change with every pouch change.       Topical Treatments to skin around stoma:  Do not apply lotions, creams, or ointments to wound bed unless directed.   yes- Apply barrier film/spray to skin around stoma and let it dry  no - Apply stoma powder to irritated skin around stoma, seal in with barrier film/spray and repeat x2  no - Apply antifungal cream to irritated skin surrounding stoma and work into skin until no longer able to feel cream.  no - Apply antifungal powder to irritated skin surrounding stoma, seal in with barrier film; and repeat x1  Other:      Pouch/Wafer Application     - Change your pouch every 2  days or when leaking.  yes - Appliance: 1 piece   Use Coloplast 27909  yes - Product Numbers:  yes - Other: Accessories: rings, barrier, powder, and film  Other:____________________________________     Application of Pouch  yes - Gather supplies needed to change pouch and wafer.  yes - Assemble new pouch system before removing old one.  yes - Using the measuring guide or pattern, trace size of opening onto back new pouch system.  yes - Cut out opening.  yes - Remove the control backing  yes - Set aside assembled pouch  yes - Remove worn appliance by gently pulling away from skin. Use ADHESIVE REMOVER/ No Sting  yes - Look at back of the pouch before throwing away to see how it is worn.   yes - Wash skin with warm water or _____________, rinse and pat dry.    yes - Inspect  skin for redness or

## 2025-07-17 NOTE — PROGRESS NOTES
[phone]           Name: Jojo Alejandre  : 1935   AGE: 87 y.o.  MRN: [id number]  Today's Date: 2023     Ostomy skin care  Cleanse skin prior to applying a new pouch/wafer with:  yes - Water         yes - Cleanse skin with Mild Soap & Water  yes - May Shower    Other: Size- 1&3/8     Wound 3.7cm x 2.1cm X 0.1cm   Wound dressing: apply collagen, silver alginate, Qwick pad, and thin duoderm change with every pouch change.       Topical Treatments to skin around stoma:  Do not apply lotions, creams, or ointments to wound bed unless directed.   yes- Apply barrier film/spray to skin around stoma and let it dry  no - Apply stoma powder to irritated skin around stoma, seal in with barrier film/spray and repeat x2  no - Apply antifungal cream to irritated skin surrounding stoma and work into skin until no longer able to feel cream.  no - Apply antifungal powder to irritated skin surrounding stoma, seal in with barrier film; and repeat x1  Other:      Pouch/Wafer Application     - Change your pouch every 2  days or when leaking.  yes - Appliance: 1 piece   Use Coloplast 15122  yes - Product Numbers:  yes - Other: Accessories: rings, barrier, powder, and film  Other:____________________________________     Application of Pouch  yes - Gather supplies needed to change pouch and wafer.  yes - Assemble new pouch system before removing old one.  yes - Using the measuring guide or pattern, trace size of opening onto back new pouch system.  yes - Cut out opening.  yes - Remove the control backing  yes - Set aside assembled pouch  yes - Remove worn appliance by gently pulling away from skin. Use ADHESIVE REMOVER/ No Sting  yes - Look at back of the pouch before throwing away to see how it is worn.   yes - Wash skin with warm water or _____________, rinse and pat dry.    yes - Inspect  skin for redness or irritation. Barrier Film around ostomy where tape goes.  Apply Exufiber Ag and cover with Qwick and thin Duoderm

## 2025-07-17 NOTE — PLAN OF CARE
Discharge instructions given.  Patient verbalized understanding.  Return to [facility] in 1 month.

## 2025-07-17 NOTE — PLAN OF CARE
[facility] Fax # [phone]        Patient Instructions   [facility]  [address]   [place], [place] [address]  Telephone: [phone]     FAX [phone]           Name: Jojo Alejandre  : 1935   AGE: 87 y.o.  MRN: [id number]  Today's Date: 2023     Ostomy skin care  Cleanse skin prior to applying a new pouch/wafer with:  yes - Water         yes - Cleanse skin with Mild Soap & Water  yes - May Shower    Other: Size- 1&3/8     Wound 3.7cm x 2.1cm X 0.1cm   Wound dressing: apply collagen, silver alginate, Qwick pad, and thin duoderm change with every pouch change.       Topical Treatments to skin around stoma:  Do not apply lotions, creams, or ointments to wound bed unless directed.   yes- Apply barrier film/spray to skin around stoma and let it dry  no - Apply stoma powder to irritated skin around stoma, seal in with barrier film/spray and repeat x2  no - Apply antifungal cream to irritated skin surrounding stoma and work into skin until no longer able to feel cream.  no - Apply antifungal powder to irritated skin surrounding stoma, seal in with barrier film; and repeat x1  Other:      Pouch/Wafer Application     - Change your pouch every 2  days or when leaking.  yes - Appliance: 1 piece   Use [employer] [id number]  yes - Product Numbers:  yes - Other: Accessories: rings, barrier, powder, and film  Other:____________________________________     Application of Pouch  yes - Gather supplies needed to change pouch and wafer.  yes - Assemble new pouch system before removing old one.  yes - Using the measuring guide or pattern, trace size of opening onto back new pouch system.  yes - Cut out opening.  yes - Remove the control backing  yes - Set aside assembled pouch  yes - Remove worn appliance by gently pulling away from skin. Use ADHESIVE REMOVER/ No Sting  yes - Look at back of the pouch before throwing away to see how it is worn.   yes - Wash skin with warm water or

## 2025-08-21 ENCOUNTER — HOSPITAL ENCOUNTER (OUTPATIENT)
Dept: WOUND CARE | Age: 89
Discharge: HOME | End: 2025-08-21
Payer: MEDICARE

## 2025-08-21 VITALS
SYSTOLIC BLOOD PRESSURE: 174 MMHG | DIASTOLIC BLOOD PRESSURE: 78 MMHG | RESPIRATION RATE: 16 BRPM | HEART RATE: 71 BPM | TEMPERATURE: 97.7 F

## 2025-08-21 DIAGNOSIS — Z43.3 COLOSTOMY CARE (HCC): Primary | ICD-10-CM

## 2025-08-21 DIAGNOSIS — L30.9 PERISTOMAL DERMATITIS: ICD-10-CM

## 2025-08-21 DIAGNOSIS — K94.00 COLOSTOMY COMPLICATION (HCC): ICD-10-CM

## 2025-08-21 DIAGNOSIS — K46.9 PERISTOMAL HERNIA: ICD-10-CM

## 2025-08-21 DIAGNOSIS — L89.892 PRESSURE ULCER OF OTHER SITE, STAGE 2 (HCC): ICD-10-CM

## 2025-08-21 DIAGNOSIS — Z93.3 COLOSTOMY STATUS (HCC): ICD-10-CM

## 2025-08-21 PROCEDURE — 99213 OFFICE O/P EST LOW 20 MIN: CPT

## 2025-08-21 ASSESSMENT — PAIN DESCRIPTION - ONSET: ONSET: ON-GOING

## 2025-08-21 ASSESSMENT — PAIN - FUNCTIONAL ASSESSMENT: PAIN_FUNCTIONAL_ASSESSMENT: PREVENTS OR INTERFERES SOME ACTIVE ACTIVITIES AND ADLS

## 2025-08-21 ASSESSMENT — PAIN DESCRIPTION - PAIN TYPE: TYPE: CHRONIC PAIN

## 2025-08-21 ASSESSMENT — PAIN DESCRIPTION - FREQUENCY: FREQUENCY: INTERMITTENT

## 2025-08-21 ASSESSMENT — PAIN SCALES - GENERAL
PAINLEVEL_OUTOF10: 0
PAINLEVEL_OUTOF10: 8

## 2025-08-21 ASSESSMENT — PAIN DESCRIPTION - ORIENTATION: ORIENTATION: OTHER (COMMENT)

## 2025-08-21 ASSESSMENT — PAIN DESCRIPTION - LOCATION: LOCATION: ABDOMEN

## (undated) DEVICE — BW-412T DISP COMBO CLEANING BRUSH: Brand: SINGLE USE COMBINATION CLEANING BRUSH

## (undated) DEVICE — AIR/WATER CLEANING ADAPTER FOR OLYMPUS® GI ENDOSCOPE: Brand: BULLDOG®

## (undated) DEVICE — FORCEPS BX L240CM WRK CHN 2.8MM STD CAP W/ NDL MIC MESH

## (undated) DEVICE — SINGLE USE AIR/WATER, SUCTION AND BIOPSY VALVES SET: Brand: ORCAPOD™

## (undated) DEVICE — MOUTHPIECE ENDOSCP L CTRL OPN AND SIDE PORTS DISP

## (undated) DEVICE — SOLUTION IRRIG 500ML STRL H2O NONPYROGENIC

## (undated) DEVICE — ENDOSCOPIC KIT 6X3/16 FT COLON W/ 1.1 OZ 2 GWN W/O BRSH